# Patient Record
Sex: FEMALE | Race: WHITE | Employment: FULL TIME | URBAN - METROPOLITAN AREA
[De-identification: names, ages, dates, MRNs, and addresses within clinical notes are randomized per-mention and may not be internally consistent; named-entity substitution may affect disease eponyms.]

---

## 2016-03-14 LAB — ANTIBODY SCREEN, EXTERNAL: NEGATIVE

## 2016-10-17 LAB
CHLAMYDIA, EXTERNAL: NEGATIVE
HBSAG, EXTERNAL: NEGATIVE
HIV, EXTERNAL: NON REACTIVE
N. GONORRHEA, EXTERNAL: NEGATIVE
RUBELLA, EXTERNAL: NORMAL
TYPE, ABO & RH, EXTERNAL: NORMAL

## 2017-03-14 LAB — T. PALLIDUM, EXTERNAL: NEGATIVE

## 2017-05-11 ENCOUNTER — HOSPITAL ENCOUNTER (OUTPATIENT)
Age: 27
Discharge: HOME | End: 2017-05-12
Attending: OBSTETRICS & GYNECOLOGY | Admitting: OBSTETRICS & GYNECOLOGY
Payer: COMMERCIAL

## 2017-05-11 LAB
ALBUMIN SERPL BCP-MCNC: 2.8 G/DL (ref 3.5–5)
ALBUMIN/GLOB SERPL: 0.7 {RATIO} (ref 1.1–2.2)
ALP SERPL-CCNC: 203 U/L (ref 45–117)
ALT SERPL-CCNC: 24 U/L (ref 12–78)
ANION GAP BLD CALC-SCNC: 11 MMOL/L (ref 5–15)
AST SERPL W P-5'-P-CCNC: 14 U/L (ref 15–37)
BILIRUB SERPL-MCNC: 0.3 MG/DL (ref 0.2–1)
BUN SERPL-MCNC: 5 MG/DL (ref 6–20)
BUN/CREAT SERPL: 9 (ref 12–20)
CALCIUM SERPL-MCNC: 9.2 MG/DL (ref 8.5–10.1)
CHLORIDE SERPL-SCNC: 104 MMOL/L (ref 97–108)
CO2 SERPL-SCNC: 20 MMOL/L (ref 21–32)
CREAT SERPL-MCNC: 0.56 MG/DL (ref 0.55–1.02)
CREAT UR-MCNC: 167 MG/DL
ERYTHROCYTE [DISTWIDTH] IN BLOOD BY AUTOMATED COUNT: 15.1 % (ref 11.5–14.5)
GLOBULIN SER CALC-MCNC: 4 G/DL (ref 2–4)
GLUCOSE SERPL-MCNC: 107 MG/DL (ref 65–100)
GRBS, EXTERNAL: NORMAL
HCT VFR BLD AUTO: 35.7 % (ref 35–47)
HGB BLD-MCNC: 11.3 G/DL (ref 11.5–16)
LDH SERPL L TO P-CCNC: 170 U/L (ref 81–246)
MCH RBC QN AUTO: 23.8 PG (ref 26–34)
MCHC RBC AUTO-ENTMCNC: 31.7 G/DL (ref 30–36.5)
MCV RBC AUTO: 75.3 FL (ref 80–99)
PLATELET # BLD AUTO: 353 K/UL (ref 150–400)
POTASSIUM SERPL-SCNC: 4.2 MMOL/L (ref 3.5–5.1)
PROT SERPL-MCNC: 6.8 G/DL (ref 6.4–8.2)
PROT UR-MCNC: 43 MG/DL (ref 0–11.9)
PROT/CREAT UR-RTO: 0.3
RBC # BLD AUTO: 4.74 M/UL (ref 3.8–5.2)
SODIUM SERPL-SCNC: 135 MMOL/L (ref 136–145)
URATE SERPL-MCNC: 3.3 MG/DL (ref 2.6–6)
WBC # BLD AUTO: 9 K/UL (ref 3.6–11)

## 2017-05-11 PROCEDURE — 99218 HC RM OBSERVATION: CPT

## 2017-05-11 PROCEDURE — 83615 LACTATE (LD) (LDH) ENZYME: CPT | Performed by: OBSTETRICS & GYNECOLOGY

## 2017-05-11 PROCEDURE — 77030012890

## 2017-05-11 PROCEDURE — 36415 COLL VENOUS BLD VENIPUNCTURE: CPT | Performed by: OBSTETRICS & GYNECOLOGY

## 2017-05-11 PROCEDURE — 84156 ASSAY OF PROTEIN URINE: CPT | Performed by: OBSTETRICS & GYNECOLOGY

## 2017-05-11 PROCEDURE — 80053 COMPREHEN METABOLIC PANEL: CPT | Performed by: OBSTETRICS & GYNECOLOGY

## 2017-05-11 PROCEDURE — 85027 COMPLETE CBC AUTOMATED: CPT | Performed by: OBSTETRICS & GYNECOLOGY

## 2017-05-11 PROCEDURE — 87081 CULTURE SCREEN ONLY: CPT | Performed by: OBSTETRICS & GYNECOLOGY

## 2017-05-11 PROCEDURE — 99285 EMERGENCY DEPT VISIT HI MDM: CPT

## 2017-05-11 PROCEDURE — 84550 ASSAY OF BLOOD/URIC ACID: CPT | Performed by: OBSTETRICS & GYNECOLOGY

## 2017-05-11 RX ORDER — DOCUSATE SODIUM 100 MG/1
100 CAPSULE, LIQUID FILLED ORAL
Status: DISCONTINUED | OUTPATIENT
Start: 2017-05-11 | End: 2017-05-12 | Stop reason: HOSPADM

## 2017-05-11 RX ORDER — LANOLIN ALCOHOL/MO/W.PET/CERES
1 CREAM (GRAM) TOPICAL
Status: DISCONTINUED | OUTPATIENT
Start: 2017-05-12 | End: 2017-05-12 | Stop reason: HOSPADM

## 2017-05-11 RX ORDER — SWAB
1 SWAB, NON-MEDICATED MISCELLANEOUS DAILY
Status: DISCONTINUED | OUTPATIENT
Start: 2017-05-12 | End: 2017-05-12 | Stop reason: HOSPADM

## 2017-05-11 RX ORDER — ZOLPIDEM TARTRATE 5 MG/1
5 TABLET ORAL
Status: DISCONTINUED | OUTPATIENT
Start: 2017-05-11 | End: 2017-05-12 | Stop reason: HOSPADM

## 2017-05-11 RX ORDER — SODIUM CHLORIDE 0.9 % (FLUSH) 0.9 %
5-10 SYRINGE (ML) INJECTION EVERY 8 HOURS
Status: DISCONTINUED | OUTPATIENT
Start: 2017-05-11 | End: 2017-05-12 | Stop reason: HOSPADM

## 2017-05-11 RX ORDER — SODIUM CHLORIDE 0.9 % (FLUSH) 0.9 %
5-10 SYRINGE (ML) INJECTION AS NEEDED
Status: DISCONTINUED | OUTPATIENT
Start: 2017-05-11 | End: 2017-05-12 | Stop reason: HOSPADM

## 2017-05-11 RX ORDER — ACETAMINOPHEN 325 MG/1
650 TABLET ORAL
Status: DISCONTINUED | OUTPATIENT
Start: 2017-05-11 | End: 2017-05-12 | Stop reason: HOSPADM

## 2017-05-11 RX ORDER — FAMOTIDINE 20 MG/1
20 TABLET, FILM COATED ORAL
Status: DISCONTINUED | OUTPATIENT
Start: 2017-05-11 | End: 2017-05-12 | Stop reason: HOSPADM

## 2017-05-11 NOTE — H&P
EDC:2017  EGA: 36 weeks, 5 days    High level admission    HPI:    at 36w5d with GHTN, LGA, presents to L&D to r/o Pre-E per Dr. Andreina Clayton    /90, repeat 124/96. Intermittent HA over the past few days - relieved temporarily with Tylenol. C/o some visual changes, Denies RUQ pain. To [facility] to r/o Pre-E.   EFW >99% (9-10, 4372g), LIZETTE 13.9cm, anterior placenta, vtx, BPP 8/8. Dr. Andreina Clayton discussed risks and benefits of ALEXANDER vs 1LTCS. Strongly considering 1LTCS. GBS cx obtained today    Vital Signs   32Years Old Female  Weight: 314.5 pounds  BMI:      46.61  BP:       132/90    Pap/HPV/Gardasil History   History of abnormal pap: no  Gardasil Injection History: Pt Advised/Considering Gardasil    Patient's Prenatal Care with Doctor of Record Kerwin Wilson MD Notable For -    Decreased fetal movement  Anemia on FE pregnant  LGA ____ >99%ile at 24wks &28wks  Obesity in pregnancy BMI>34.99-FS ____, BMI 44, FS ___, growth q4wk ___, wkly surveillance 36wk __, consider delivery by 40wk   lab screening  Normal pregnancy primigravida  Family History Breast Cancer    Allergies    This patient has no known allergies. Medications Removed from Medication List    [address] [place] [address] for Follow-up Visit     Estimated weeks of        gestation:  36 5/7     Weight:  314.5     Blood pressure: 132 / 90     Urine Protein:  N     Urine Glucose: N     Headache:  few     Nausea/vomiting: No     Edema:  feet     Vaginal bleeding: no     Vaginal discharge: d/c     Fetal activity:  yes     Fundal height:    45     FHR:   141     Labor symptoms: few ctx     Fetal position:  vertex     Cx Dilation:  FT     Cx Effacement: 70%     Cx Station:  -2     Next visit:  1 wk     Preceptor:  michael     Comment:   Repeat /96. Intermittent HA over the past few days - relieved w/ tylenol then returned, some visual changes started today, no RUQ pain. To [facility] (w/ GBS in hand) to r/o PEC.  EFW >99% (9-10, 4372g), LIZETTE 13.9cm, anterior placenta, vtx, BPP . Discussed R/B of ALEXANDER vs 1LTCS. Strongly considering 1LTCS. Impression & Recommendations:        Problem # 1:  Transient HTN in pregnancy (ICD-642.33) (LZY02-V03.3)    BP elevated x2 in office. New onset HA and visual changes. Recommend L&D evaluation to r/o Pre-E/HELLP. Serial BP, labs, NST. Pt planning [facility] delivery so prefers to go there for evaluation. Problem #2:   LGA s/p U/s today with EFW 99% ;; 9lbs 10oz. Nl glucola    Fetal Weight Calculation:  EFW 4,372 g  >99%   [facility]lock  EFW (lb,oz) 9 lb  10  oz  Calculated by [facility]lock (BPD-HC-AC-FL)    Discussed R/B of ALEXANDER vs 1LTCS. Pt strongly considering 1LTCS. Problem # 3:    GBS cx pending    Medications (at conclusion of this visit)    10/17/2016 PRENATAL FORMULA CAPS (PRENATAL VIT-FE FUM-FA-OMEGA CAPS)     Primary Provider:  Tom West MD      History of Present Illness:  31 y/o  @ 36.5wks presents for routine PN visit and growth scan. New onset HA and visual changes. No RUQ pain. No ctx, LOF, VB. Good fetal movement. Pregnancy complicated by obesity (BMI 46) and LGA (EFW >99% 9-10 today). Past Medical History:     Reviewed history from 2016 and no changes required:        Anxiety    Past Surgical History:     Reviewed history from 2016 and no changes required:        wisdom teeth    Family History Summary:      Reviewed history Last on 2017 and no changes required:2017      General Comments - FH:  Family history transferred to [facility] [employer]        Social History:     Reviewed history from 10/17/2016 and no changes required:                Works at [employer]                Smoking History:        Patient is a former smoker. Review of Systems        See HPI    Except as noted in the HPI, the review of systems is negative for General, Breast, , Resp, GI, Endo, MS, Psych and Heme.       Vital Signs    Blood Pressure: 132 / 90    Weight:  314.5 pounds    BMI:   46.61 inches    Physical Exam     General           General appearance:  no acute distress    Head           Inspection:   normal    Eyes           External:   EOM intact    ENT           Dental:   adequate dentition    Chest           Lungs:  clear to auscultation          Heart:  regular rate and rhythm    Extremeties           Extremeties:  tr edema bilaterally    Neurological           Reflexes:  2+ and symmetric with no pathological reflexes    Psych           Orientation:  oriented to time, place, and person          Mood:  no appearance of anxiety, depression, or agitation    Lymph           Inguinal:  no inguinal adenopathy    Skin           Inspection:  no rashes, suspicious lesions, or ulcerations    Abdomen           Abdomen:  gravid          Fundal Height:  45          EFW:  9-10    Pelvic Exam           EGBUS:  no lesions          Vagina:  normal appearing without lesions or discharge          Uterus:  gravid          Cervix:  no lesions or discharge                  Dilation: : FT                  Effacement:  70%                  Station:  -2                  Presentation:  vertex                  Membranes:  intact                  Position: mid    Allergies    This patient has no known allergies. Medications Removed from Medication List        Impression & Recommendations:    Problem # 1:  Normal pregnancy primigravida (ICD-V22.1) (UXJ19-K62.87)  GBS collected today. Pt to take to [address] [place] [address] L&D. Orders:  Antepartum Care (CPT-10)      Problem # 2:  Transient HTN in pregnancy (ICD-642.33) (WYD00-E06.3)  BP elevated x2 in office. New onset HA and visual changes. Recommend L&D evaluation to r/o PEC/HELLP. Serial BP, labs, NST. Pt planning [address] [place] [address] delivery so prefers to go there for evaluation. Orders:  Patient Sent to L&D (CPT-LD)  Sent to L&D  (CPT-AdmitF)      Problem # 3:  LGA ____ >99%ile at 24wks &28wks (ICD-656.63) (PDI49-X97.62x0)  EFW >99% (9-10) today.    Discussed R/B of ALEXANDER vs 1LTCS. Pt strongly considering 1LTCS. Problem # 4:  Obesity in pregnancy BMI>34.99-FS ____, BMI 44, FS ___, growth q4wk ___, wkly surveillance 36wk __, consider delivery by 40wk (OJE-821.95) (VMN07-P33.213)    Orders:  Weekly PNV w/ NST + BPP No MFM (NST & BPP @ each visit) (xxxx)        Medications (at conclusion of this visit)    10/17/2016 PRENATAL FORMULA CAPS (PRENATAL VIT-FE FUM-FA-OMEGA CAPS)           LABORATORY DATA   TEST DATE RESULT   Group B Strep culture                                     (Group B Strep Culture Result Field)   Blood Type 10/17/2016 O                                             (Blood Type Result Field)   Rh 10/17/2016 Positive                                   (Rh Result Field)   Rhogam Inj Given     Tdap Vaccine Given 03/14/2017 Vacc. [address]   Antibody Screen 03/14/2017 Negative   Rubella  [facility] Reference Ranges On or After 3/10/14                  <0.90              Non-immune      0.90 - 0.99     Equivocal      >0.99              Immune    [facility] Reference Ranges  Before 3/10/14           <5                 Non-immune             5 - 9               Equivocal            >9                 Immune  [facility] Reference Ranges       < Or = 0.90       Negative             0.91-1.09          Equivocal            > Or = 1.10       Positive   10/17/2016     2.05     TPA (T Pallidum Antibodies) 03/14/2017 Negative   Serology (RPR)     HBsAg 10/17/2016 Negative   HIV 10/17/2016 Non Reactive   Hemoglobin 03/14/2017 11.0   Hematocrit 03/14/2017 32.6   Platelets 24/83/0415 276 X10E3/UL   TSH     Urine Culture 10/17/2016 Negative   GC DNA Probe 10/17/2016 Negative   Chlamydia DNA 10/17/2016 Negative   PAP 10/17/2016 NIL   Flu Vaccine Given 10/17/2016 Vacc.  VWC   HGBA1C     HGB Electro     T4, Free     BG Fasting     GTT 1H 50G 03/14/2017 119   GTT 1H 100G     GTT 2H 100G     GTT 3H 100G     Glucose Plasma     CF Accept or Decline 10/17/2016 Declined   CF Screen Result 10/17/2016 Declined Nuchal Trans 02/14/2017 6.92^6. 92 mm&millimeters   AFP Only     Tetra 01/19/2017 *Screen Negative*   AFP Serum     CVS 10/17/2016 declined   AFP Amniotic     Amnio Karyo     FISH     GC Culture     Chlamydia Cult     Ureaplasma     Mycoplasma     WBC 10/17/2016 9.6 X10E3/UL   RBC 10/17/2016 5.07 X10E6/UL   MCV 10/17/2016 79   MCH 10/17/2016 25.4   MCHC RBC 10/17/2016 32.1     ULTRASOUND DATA   TEST DATE RESULT   Estimated Fetal Weight 04/11/2017 1974.80153971^4882 g&grams                                     Weight % 04/11/2017 100^>99% %&percent                                                LIZETTE 04/11/2017 52.22^52.6 cm&centimeters                    BPP 04/11/2017 8^8 [n/a]&Not applicable   Cervical Length (mm)       ]      Electronically signed by Paramjit Caicedo MD on 05/11/2017 at 10:06 AM    ________________________________________________________________________

## 2017-05-11 NOTE — PROGRESS NOTES
200 Admit 31 y/o  36.5 weeks to LD room 7 due to elevated blood pressures in office today. Headache consistent for last 3 days and visual disturbances present today in office. Denies RUQ pain, bleeding, leaking of fluid. EFM x 2 applied. 200 Dr. Lennox Ireland notified of blood pressures and lab results. 1234 Dr. Lennox Ireland at bedside. Reviewed tracing. Discussing plan of care with patient. Orders to transfer to APU and start 24 hour urine. Patient agrees with plan of care at this time. 1315 TRANSFER - OUT REPORT:    Verbal report given to KALYANI Zepeda Rn(name) on Boni Degroot  being transferred to APu 325(unit) for routine progression of care       Report consisted of patients Situation, Background, Assessment and   Recommendations(SBAR). Information from the following report(s) SBAR, Kardex, Procedure Summary, Intake/Output, MAR and Recent Results was reviewed with the receiving nurse. Lines:       Opportunity for questions and clarification was provided.       Patient transported with:   Registered Nurse

## 2017-05-11 NOTE — PROGRESS NOTES
TRANSFER - IN REPORT:    Verbal report received from SAGE Ventura RN(name) on Tian Mccracken  being received from L&D (unit) for routine progression of care      Report consisted of patients Situation, Background, Assessment and   Recommendations(SBAR). Information from the following report(s) SBAR was reviewed with the receiving nurse. Opportunity for questions and clarification was provided. Assessment completed upon patients arrival to unit and care assumed.

## 2017-05-11 NOTE — PROGRESS NOTES
Bedside and Verbal shift change report given to ALIYA Canas RN (oncoming nurse) by KALYANI Kirkland RN (offgoing nurse). Report included the following information SBAR, Kardex, MAR and Recent Results.

## 2017-05-11 NOTE — IP AVS SNAPSHOT
[address] 
[phone] Patient: Magan Gutiérrez MRN: [id number] MMN:[id number] You are allergic to the following No active allergies Recent Documentation Height Weight Breastfeeding? BMI OB Status Smoking Status 1.753 m 142.4 kg No 46.37 kg/m2 Pregnant Former Smoker Unresulted Labs Order Current Status SAMPLE TO BLOOD BANK In process CULTURE, GENITAL GROUP B STREP Preliminary result Emergency Contacts Name Discharge Info Relation Home Work Mobile Fina Otero  Parent [1] [phone] Miller Smith DISCHARGE CAREGIVER [3] Spouse [3] [phone] About your hospitalization You were admitted on:  May 11, 2017 You last received care in the[address] [facility] You were discharged on:  May 12, 2017 Unit phone number:  [phone] Why you were hospitalized Your primary diagnosis was:  Not on File Providers Seen During Your Hospitalizations Provider Role Specialty Primary office phone Tyrone Marrufo MD Attending Provider Obstetrics & Gynecology [phone] Your Primary Care Physician (PCP) Primary Care Physician Office Phone Office Fax Tatiana [place] [phone] Follow-up Information Follow up With Details Comments Contact Info Tyrone Marrufo MD   [address] [facility] 
[phone] Jose Martin Chapa MD On 5/15/2017 be here by 10am for 12p C/S. 200 [facility] [address] [place] [address]  [address] 
[phone] Current Discharge Medication List  
  
CONTINUE these medications which have NOT CHANGED Dose & Instructions Dispensing Information Comments Morning Noon Evening Bedtime PNV No12-Iron-FA-DSS-OM-3 29 mg iron-1 mg -50 mg Cpkd Your last dose was: Your next dose is: Take  by mouth. Refills:  0 Discharge Instructions Please call for BP >160/110 Call for headache, visual changes, right upper quadrant pain. Preeclampsia: Care Instructions Your Care Instructions Preeclampsia occurs when a woman's blood pressure rises during pregnancy. Often with preeclampsia, you also have swelling in your legs, hands, and face. A test may show too much protein in your urine. Preeclampsia is also called toxemia. If preeclampsia is severe and not treated, it can lead to seizures (eclampsia) and damage to your liver or kidneys. Preeclampsia can prevent your baby from getting enough food and oxygen. This can cause a low birth weight or other problems. Your doctor will watch you closely to prevent these problems. He or she also may recommend that you rest in bed most of the day. If your preeclampsia is a danger to your health or the health of your baby, your doctor may need to deliver your baby early. While preeclampsia is a concern, most women with preeclampsia have healthy babies. After a woman gives birth, preeclampsia usually goes away on its own. Follow-up care is a key part of your treatment and safety. Be sure to make and go to all appointments, and call your doctor if you are having problems. It's also a good idea to know your test results and keep a list of the medicines you take. How can you care for yourself at home? · Take and record your blood pressure at home if your doctor tells you to. ¨ Learn the importance of the two measures of blood pressure (such as 120 over 80, or 120/80). The first number is the systolic pressure, which is the force of blood on the artery walls as the heart pumps. The second number is the diastolic pressure, which is the force of blood on the artery walls between heartbeats, when the heart is at rest. You have a choice of monitors to use. ¨ Manual monitor: You pump up the cuff and use a stethoscope to listen for your pulse. ¨ Electronic monitor: The cuff inflates, and a gauge shows your pulse rate. ¨ To take your blood pressure: ¨ Ask your doctor to check your blood pressure monitor to be sure that it is accurate and that the cuff fits you. Also ask your doctor to watch you to make sure that you are using it right. ¨ You should not eat, use tobacco products, or use medicine known to raise blood pressure (such as some nasal decongestant sprays) before you take your blood pressure. ¨ Avoid taking your blood pressure if you have just exercised or are nervous or upset. Rest at least 15 minutes before you take your blood pressure. · If your doctor advises, check the protein levels in your urine. Your doctor or nurse will show you how to do this. · Take your medicines exactly as prescribed. Call your doctor if you think you are having a problem with your medicine. · Do not smoke. Quitting smoking will help lower your blood pressure and improve your baby's growth and health. If you need help quitting, talk to your doctor about stop-smoking programs and medicines. These can increase your chances of quitting for good. · Eat a balanced and healthy diet that has lots of fruits and vegetables. · If your doctor advised bed rest, be sure to stay off your feet and rest as much as possible. ¨ Keep a phone, phone book, notepad, and pen near the bed where you can easily reach them. ¨ Gently stretch your legs every hour to maintain good blood flow. ¨ Have another family member pack snacks and lunch food in a cooler close to your bed. ¨ Use this time for activities that you usually cannot find time for, such as reading, craft projects, or letter writing. · You can keep track of your baby's health by noting the length of time it takes to count 10 movements (such as kicks, flutters, or rolls).  Feeling 10 movements in less than 1 hour is considered normal. Track your baby's movements once each day, and bring this record with you to each prenatal visit. When should you call for help? Call 911 anytime you think you may need emergency care. For example, call if: 
· You have a seizure. · You passed out (lost consciousness). · You have severe vaginal bleeding. · You have severe pain in your belly or pelvis. · You have had fluid gushing or leaking from your vagina and you know or think the umbilical cord is bulging into your vagina. If this happens, immediately get down on your knees so your rear end (buttocks) is higher than your head. This will decrease the pressure on the cord until help arrives. Call your doctor now or seek immediate medical care if: 
· You have sudden swelling of your face, hands, or feet. · You have new vision problems (such as dimness or blurring). · You have a severe headache. · You have any vaginal bleeding. · You have belly pain or cramping. · You have a fever. · You have had regular contractions (with or without pain) for an hour. This means that you have 8 or more within 1 hour or 4 or more in 20 minutes after you change your position and drink fluids. · You have a sudden release of fluid from the vagina. · You have low back pain or pelvic pressure that does not go away. · You notice that your baby has stopped moving or is moving much less than normal. 
Watch closely for changes in your health, and be sure to contact your doctor if you have any questions. Where can you learn more? Go to http://jai-memo.info/. Enter G277 in the search box to learn more about \"Preeclampsia: Care Instructions. \" Current as of: May 30, 2016 Content Version: 11.2 © 7006-0684 [employer]  Care instructions adapted under license by [employer] (which disclaims liability or warranty for this information). If you have questions about a medical condition or this instruction, always ask your healthcare professional. [address] any warranty or liability for your use of this information. Discharge Orders None [facility] Announcement We are excited to announce that we are making your provider's discharge notes available to you in [facility]. You will see these notes when they are completed and signed by the physician that discharged you from your recent hospital stay. If you have any questions or concerns about any information you see in [facility], please call the Health Information Department where you were seen or reach out to your Primary Care Provider for more information about your plan of care. Introducing hospitals & HEALTH SERVICES! Toribio Nyhan introduces [facility] patient portal. Now you can access parts of your medical record, email your doctor's office, and request medication refills online. 1. In your internet browser, go to [email or web address] 2. Click on the First Time User? Click Here link in the Sign In box. You will see the New Member Sign Up page. 3. Enter your [facility] Access Code exactly as it appears below. You will not need to use this code after youve completed the sign-up process. If you do not sign up before the expiration date, you must request a new code. · [facility] Access Code: [email or web address] Expires: 8/10/2017 11:33 AM 
 
4. Enter the last four digits of your Social Security Number (xxxx) and Date of Birth (mm/dd/yyyy) as indicated and click Submit. You will be taken to the next sign-up page. 5. Create a [facility] ID. This will be your [facility] login ID and cannot be changed, so think of one that is secure and easy to remember. 6. Create a [facility] password. You can change your password at any time. 7. Enter your Password Reset Question and Answer. This can be used at a later time if you forget your password. 8. Enter your e-mail address. You will receive e-mail notification when new information is available in [address] 9. Click Sign Up. You can now view and download portions of your medical record. 10. Click the Download Summary menu link to download a portable copy of your medical information. If you have questions, please visit the Frequently Asked Questions section of the [employer] website. Remember, [employer] is NOT to be used for urgent needs. For medical emergencies, dial 911. Now available from your iPhone and Android! General Information Please provide this summary of care documentation to your next provider. Patient Signature:  ____________________________________________________________ Date:  ____________________________________________________________  
  
[place] Search Provider Signature:  ____________________________________________________________ Date:  ____________________________________________________________

## 2017-05-12 VITALS
HEART RATE: 92 BPM | TEMPERATURE: 98.4 F | SYSTOLIC BLOOD PRESSURE: 125 MMHG | WEIGHT: 293 LBS | HEIGHT: 69 IN | DIASTOLIC BLOOD PRESSURE: 75 MMHG | RESPIRATION RATE: 16 BRPM | BODY MASS INDEX: 43.4 KG/M2

## 2017-05-12 LAB
ALBUMIN SERPL BCP-MCNC: 2.3 G/DL (ref 3.5–5)
ALBUMIN/GLOB SERPL: 0.5 {RATIO} (ref 1.1–2.2)
ALP SERPL-CCNC: 177 U/L (ref 45–117)
ALT SERPL-CCNC: 22 U/L (ref 12–78)
ANION GAP BLD CALC-SCNC: 9 MMOL/L (ref 5–15)
AST SERPL W P-5'-P-CCNC: 11 U/L (ref 15–37)
BILIRUB SERPL-MCNC: 0.2 MG/DL (ref 0.2–1)
BUN SERPL-MCNC: 5 MG/DL (ref 6–20)
BUN/CREAT SERPL: 9 (ref 12–20)
CALCIUM SERPL-MCNC: 8.9 MG/DL (ref 8.5–10.1)
CHLORIDE SERPL-SCNC: 105 MMOL/L (ref 97–108)
CO2 SERPL-SCNC: 23 MMOL/L (ref 21–32)
COLLECT DURATION TIME UR: 24 HR
CREAT SERPL-MCNC: 0.54 MG/DL (ref 0.55–1.02)
ERYTHROCYTE [DISTWIDTH] IN BLOOD BY AUTOMATED COUNT: 15.2 % (ref 11.5–14.5)
GLOBULIN SER CALC-MCNC: 4.4 G/DL (ref 2–4)
GLUCOSE SERPL-MCNC: 106 MG/DL (ref 65–100)
HCT VFR BLD AUTO: 34 % (ref 35–47)
HGB BLD-MCNC: 10.9 G/DL (ref 11.5–16)
LDH SERPL L TO P-CCNC: 136 U/L (ref 81–246)
MCH RBC QN AUTO: 24 PG (ref 26–34)
MCHC RBC AUTO-ENTMCNC: 32.1 G/DL (ref 30–36.5)
MCV RBC AUTO: 74.7 FL (ref 80–99)
PLATELET # BLD AUTO: 297 K/UL (ref 150–400)
POTASSIUM SERPL-SCNC: 4.1 MMOL/L (ref 3.5–5.1)
PROT 24H UR-MRATE: 455 MG/24HR
PROT SERPL-MCNC: 6.7 G/DL (ref 6.4–8.2)
RBC # BLD AUTO: 4.55 M/UL (ref 3.8–5.2)
SODIUM SERPL-SCNC: 137 MMOL/L (ref 136–145)
SPECIMEN VOL ?TM UR: 3500 ML
URATE SERPL-MCNC: 3.2 MG/DL (ref 2.6–6)
WBC # BLD AUTO: 8.2 K/UL (ref 3.6–11)

## 2017-05-12 PROCEDURE — 36415 COLL VENOUS BLD VENIPUNCTURE: CPT | Performed by: OBSTETRICS & GYNECOLOGY

## 2017-05-12 PROCEDURE — 85027 COMPLETE CBC AUTOMATED: CPT | Performed by: OBSTETRICS & GYNECOLOGY

## 2017-05-12 PROCEDURE — 80053 COMPREHEN METABOLIC PANEL: CPT | Performed by: OBSTETRICS & GYNECOLOGY

## 2017-05-12 PROCEDURE — 99218 HC RM OBSERVATION: CPT

## 2017-05-12 PROCEDURE — 74011250637 HC RX REV CODE- 250/637: Performed by: OBSTETRICS & GYNECOLOGY

## 2017-05-12 PROCEDURE — 83615 LACTATE (LD) (LDH) ENZYME: CPT | Performed by: OBSTETRICS & GYNECOLOGY

## 2017-05-12 PROCEDURE — 84550 ASSAY OF BLOOD/URIC ACID: CPT | Performed by: OBSTETRICS & GYNECOLOGY

## 2017-05-12 RX ADMIN — Medication 1 TABLET: at 09:10

## 2017-05-12 RX ADMIN — FERROUS SULFATE TAB 325 MG (65 MG ELEMENTAL FE) 325 MG: 325 (65 FE) TAB at 09:10

## 2017-05-12 NOTE — PROGRESS NOTES
Ante Partum Progress Note    Rina Holman  36w6d    Assessment: 36w6d   1. GHTN. - now mild preE  - all BPs normal to mild range  - HA improved  - 24hr rwtr=551ez     2. LGA, nl Glucola, EFW 9lbs 10 oz ()  Pt unsure of ALEXANDER vs primary LTC/S. Will consider options and decide based on progress  - cvx today /high  - discussed IOL vs primary CS again - she would like primary CS, will schedule Monday.      3. GBS pending    Plan:  DC home with preE precautions - call for HA, RUQ pain, visual changes. - call for BP >160/110  - will schedule CS for Monday     Orders/Charges: Medium and Non Stress Test    Patient states she has no new complaints    Vitals:  Visit Vitals    /75 (BP 1 Location: Left arm)    Pulse 97    Temp 98.3 °F (36.8 °C)    Resp 18    Ht 5' 9\" (1.753 m)    Wt 142.4 kg (314 lb)    Breastfeeding No    BMI 46.37 kg/m2     Temp (24hrs), Av.1 °F (36.7 °C), Min:97.9 °F (36.6 °C), Max:98.3 °F (36.8 °C)      Last 24hr Input/Output:    Intake/Output Summary (Last 24 hours) at 17 1504  Last data filed at 17 3807   Gross per 24 hour   Intake             3100 ml   Output             2150 ml   Net              950 ml        Non stress test:  Reactive  Baseline 135, moderate variabiliy, +accels, no decels    No data found. No data found. Uterine Activity: irregular     Exam:  Patient without distress.      Abdomen, fundus soft non-tender     Extremities, no redness or tenderness         cvx - /high         Additional Exam: Deferred    Labs:     Lab Results   Component Value Date/Time    WBC 8.2 2017 06:23 AM    WBC 9.0 2017 11:30 AM    HGB 10.9 2017 06:23 AM    HGB 11.3 2017 11:30 AM    HCT 34.0 2017 06:23 AM    HCT 35.7 2017 11:30 AM    PLATELET 658  06:23 AM    PLATELET 936  11:30 AM       Recent Results (from the past 24 hour(s))   METABOLIC PANEL, COMPREHENSIVE    Collection Time: 17  6:23 AM   Result Value Ref Range    Sodium 137 136 - 145 mmol/L    Potassium 4.1 3.5 - 5.1 mmol/L    Chloride 105 97 - 108 mmol/L    CO2 23 21 - 32 mmol/L    Anion gap 9 5 - 15 mmol/L    Glucose 106 (H) 65 - 100 mg/dL    BUN 5 (L) 6 - 20 MG/DL    Creatinine 0.54 (L) 0.55 - 1.02 MG/DL    BUN/Creatinine ratio 9 (L) 12 - 20      GFR est AA >60 >60 ml/min/1.73m2    GFR est non-AA >60 >60 ml/min/1.73m2    Calcium 8.9 8.5 - 10.1 MG/DL    Bilirubin, total 0.2 0.2 - 1.0 MG/DL    ALT (SGPT) 22 12 - 78 U/L    AST (SGOT) 11 (L) 15 - 37 U/L    Alk.  phosphatase 177 (H) 45 - 117 U/L    Protein, total 6.7 6.4 - 8.2 g/dL    Albumin 2.3 (L) 3.5 - 5.0 g/dL    Globulin 4.4 (H) 2.0 - 4.0 g/dL    A-G Ratio 0.5 (L) 1.1 - 2.2     LD    Collection Time: 05/12/17  6:23 AM   Result Value Ref Range     81 - 246 U/L   URIC ACID    Collection Time: 05/12/17  6:23 AM   Result Value Ref Range    Uric acid 3.2 2.6 - 6.0 MG/DL   CBC W/O DIFF    Collection Time: 05/12/17  6:23 AM   Result Value Ref Range    WBC 8.2 3.6 - 11.0 K/uL    RBC 4.55 3.80 - 5.20 M/uL    HGB 10.9 (L) 11.5 - 16.0 g/dL    HCT 34.0 (L) 35.0 - 47.0 %    MCV 74.7 (L) 80.0 - 99.0 FL    MCH 24.0 (L) 26.0 - 34.0 PG    MCHC 32.1 30.0 - 36.5 g/dL    RDW 15.2 (H) 11.5 - 14.5 %    PLATELET 107 081 - 748 K/uL

## 2017-05-12 NOTE — PROGRESS NOTES
Bedside and Verbal shift change report given to Rick Blunt (oncoming nurse) by Eric Garcia RN (offgoing nurse). Report included the following information SBAR, Kardex and MAR.

## 2017-05-12 NOTE — DISCHARGE INSTRUCTIONS
Please call for BP >160/110  Call for headache, visual changes, right upper quadrant pain. Preeclampsia: Care Instructions  Your Care Instructions  Preeclampsia occurs when a woman's blood pressure rises during pregnancy. Often with preeclampsia, you also have swelling in your legs, hands, and face. A test may show too much protein in your urine. Preeclampsia is also called toxemia. If preeclampsia is severe and not treated, it can lead to seizures (eclampsia) and damage to your liver or kidneys. Preeclampsia can prevent your baby from getting enough food and oxygen. This can cause a low birth weight or other problems. Your doctor will watch you closely to prevent these problems. He or she also may recommend that you rest in bed most of the day. If your preeclampsia is a danger to your health or the health of your baby, your doctor may need to deliver your baby early. While preeclampsia is a concern, most women with preeclampsia have healthy babies. After a woman gives birth, preeclampsia usually goes away on its own. Follow-up care is a key part of your treatment and safety. Be sure to make and go to all appointments, and call your doctor if you are having problems. It's also a good idea to know your test results and keep a list of the medicines you take. How can you care for yourself at home? · Take and record your blood pressure at home if your doctor tells you to. ¨ Learn the importance of the two measures of blood pressure (such as 120 over 80, or 120/80). The first number is the systolic pressure, which is the force of blood on the artery walls as the heart pumps. The second number is the diastolic pressure, which is the force of blood on the artery walls between heartbeats, when the heart is at rest. You have a choice of monitors to use. ¨ Manual monitor: You pump up the cuff and use a stethoscope to listen for your pulse.   ¨ Electronic monitor: The cuff inflates, and a gauge shows your pulse rate.  ¨ To take your blood pressure:  ¨ Ask your doctor to check your blood pressure monitor to be sure that it is accurate and that the cuff fits you. Also ask your doctor to watch you to make sure that you are using it right. ¨ You should not eat, use tobacco products, or use medicine known to raise blood pressure (such as some nasal decongestant sprays) before you take your blood pressure. ¨ Avoid taking your blood pressure if you have just exercised or are nervous or upset. Rest at least 15 minutes before you take your blood pressure. · If your doctor advises, check the protein levels in your urine. Your doctor or nurse will show you how to do this. · Take your medicines exactly as prescribed. Call your doctor if you think you are having a problem with your medicine. · Do not smoke. Quitting smoking will help lower your blood pressure and improve your baby's growth and health. If you need help quitting, talk to your doctor about stop-smoking programs and medicines. These can increase your chances of quitting for good. · Eat a balanced and healthy diet that has lots of fruits and vegetables. · If your doctor advised bed rest, be sure to stay off your feet and rest as much as possible. ¨ Keep a phone, phone book, notepad, and pen near the bed where you can easily reach them. ¨ Gently stretch your legs every hour to maintain good blood flow. ¨ Have another family member pack snacks and lunch food in a cooler close to your bed. ¨ Use this time for activities that you usually cannot find time for, such as reading, craft projects, or letter writing. · You can keep track of your baby's health by noting the length of time it takes to count 10 movements (such as kicks, flutters, or rolls). Feeling 10 movements in less than 1 hour is considered normal. Track your baby's movements once each day, and bring this record with you to each prenatal visit. When should you call for help?   Call 911 anytime you think you may need emergency care. For example, call if:  · You have a seizure. · You passed out (lost consciousness). · You have severe vaginal bleeding. · You have severe pain in your belly or pelvis. · You have had fluid gushing or leaking from your vagina and you know or think the umbilical cord is bulging into your vagina. If this happens, immediately get down on your knees so your rear end (buttocks) is higher than your head. This will decrease the pressure on the cord until help arrives. Call your doctor now or seek immediate medical care if:  · You have sudden swelling of your face, hands, or feet. · You have new vision problems (such as dimness or blurring). · You have a severe headache. · You have any vaginal bleeding. · You have belly pain or cramping. · You have a fever. · You have had regular contractions (with or without pain) for an hour. This means that you have 8 or more within 1 hour or 4 or more in 20 minutes after you change your position and drink fluids. · You have a sudden release of fluid from the vagina. · You have low back pain or pelvic pressure that does not go away. · You notice that your baby has stopped moving or is moving much less than normal.  Watch closely for changes in your health, and be sure to contact your doctor if you have any questions. Where can you learn more? Go to http://jai-memo.info/. Enter R047 in the search box to learn more about \"Preeclampsia: Care Instructions. \"  Current as of: May 30, 2016  Content Version: 11.2  © 2728-0024 [employer]. Care instructions adapted under license by [employer] (which disclaims liability or warranty for this information). If you have questions about a medical condition or this instruction, always ask your healthcare professional. [address] any warranty or liability for your use of this information.

## 2017-05-12 NOTE — DISCHARGE SUMMARY
Antepartum  Discharge Summary     Patient ID:  Abhilash Coelho  [id number]  32 y.o.  1990    Admit date: 5/11/2017    Discharge date: 5/12/2017    Admission Diagnoses: There is no problem list on file for this patient. Discharge Diagnoses: There are no discharge diagnoses documented for the most recent discharge. There is no problem list on file for this patient. Procedures for this admission:     Hospital Course: labs normal. BPs normal to mildly elevated. 24h UTP =455mg  1. GHTN. - now mild preE  - all BPs normal to mild range  - HA improved  - 24hr naxn=621dx      2. LGA, nl Glucola, EFW 9lbs 10 oz (5/11)  Pt unsure of ALEXANDER vs primary LTC/S. Will consider options and decide based on progress  - cvx today 1/30/high  - discussed IOL vs primary CS again - she would like primary CS, will schedule Monday.       3. GBS pending     Plan: DC home with preE precautions - call for HA, RUQ pain, visual changes. - call for BP >160/110  - will schedule CS for Monday     Disposition: home    Discharged Condition: stable            Patient Instructions:   Current Discharge Medication List      CONTINUE these medications which have NOT CHANGED    Details   PNV No12-Iron-FA-DSS-OM-3 29 mg iron-1 mg -50 mg CPKD Take  by mouth. Activity: modified bedrest   Diet: Regular Diet    Follow-up with   Follow-up Appointments   Procedures    FOLLOW UP VISIT Appointment in: Other (Specify) [address] on [id number] on monday     Standing Status:   Standing     Number of Occurrences:   1     Order Specific Question:   Appointment in     Answer:    Other (Specify)        Signed:  Pirya Alfonso MD  5/12/2017  3:22 PM

## 2017-05-12 NOTE — PROGRESS NOTES
Bedside shift change report given to Grayson Sevilla RN (oncoming nurse) by Angle Moraes RN (offgoing nurse). Report included the following information SBAR, Kardex and MAR.     1130 24hour urine sample taken to lab.    1620 pt to be discharged home with return on Monday morning for a  section. All instructions given and questions answered. Pt ambulated off unit at 1640.

## 2017-05-14 ENCOUNTER — ANESTHESIA (OUTPATIENT)
Dept: LABOR AND DELIVERY | Age: 27
End: 2017-05-14
Payer: COMMERCIAL

## 2017-05-14 ENCOUNTER — ANESTHESIA EVENT (OUTPATIENT)
Dept: LABOR AND DELIVERY | Age: 27
End: 2017-05-14
Payer: COMMERCIAL

## 2017-05-14 ENCOUNTER — HOSPITAL ENCOUNTER (INPATIENT)
Age: 27
LOS: 3 days | Discharge: HOME | End: 2017-05-17
Attending: OBSTETRICS & GYNECOLOGY | Admitting: OBSTETRICS & GYNECOLOGY
Payer: COMMERCIAL

## 2017-05-14 PROBLEM — O42.90 PROM (PREMATURE RUPTURE OF MEMBRANES): Status: ACTIVE | Noted: 2017-05-14

## 2017-05-14 PROBLEM — O99.210 MATERNAL OBESITY AFFECTING PREGNANCY, ANTEPARTUM: Status: ACTIVE | Noted: 2017-05-14

## 2017-05-14 PROBLEM — O14.90 PREECLAMPSIA: Status: ACTIVE | Noted: 2017-05-14

## 2017-05-14 LAB
BACTERIA SPEC CULT: NORMAL
BASOPHILS # BLD AUTO: 0 K/UL (ref 0–0.1)
BASOPHILS # BLD: 0 % (ref 0–1)
EOSINOPHIL # BLD: 0.1 K/UL (ref 0–0.4)
EOSINOPHIL NFR BLD: 1 % (ref 0–7)
ERYTHROCYTE [DISTWIDTH] IN BLOOD BY AUTOMATED COUNT: 15.2 % (ref 11.5–14.5)
GRBS, EXTERNAL: NEGATIVE
HCT VFR BLD AUTO: 35.6 % (ref 35–47)
HGB BLD-MCNC: 11.2 G/DL (ref 11.5–16)
LYMPHOCYTES # BLD AUTO: 14 % (ref 12–49)
LYMPHOCYTES # BLD: 1.4 K/UL (ref 0.8–3.5)
MCH RBC QN AUTO: 23.8 PG (ref 26–34)
MCHC RBC AUTO-ENTMCNC: 31.5 G/DL (ref 30–36.5)
MCV RBC AUTO: 75.7 FL (ref 80–99)
MONOCYTES # BLD: 0.7 K/UL (ref 0–1)
MONOCYTES NFR BLD AUTO: 7 % (ref 5–13)
NEUTS SEG # BLD: 7.9 K/UL (ref 1.8–8)
NEUTS SEG NFR BLD AUTO: 78 % (ref 32–75)
PLATELET # BLD AUTO: 352 K/UL (ref 150–400)
RBC # BLD AUTO: 4.7 M/UL (ref 3.8–5.2)
SERVICE CMNT-IMP: NORMAL
WBC # BLD AUTO: 10 K/UL (ref 3.6–11)

## 2017-05-14 PROCEDURE — 75410000002 HC LABOR FEE PER 1 HR

## 2017-05-14 PROCEDURE — 77030011220 HC DEV ELECSURG COVD -B

## 2017-05-14 PROCEDURE — 77030002974 HC SUT PLN J&J -A

## 2017-05-14 PROCEDURE — 74011250636 HC RX REV CODE- 250/636: Performed by: ANESTHESIOLOGY

## 2017-05-14 PROCEDURE — 76010000391 HC C SECN FIRST 1 HR: Performed by: OBSTETRICS & GYNECOLOGY

## 2017-05-14 PROCEDURE — 77030033269 HC SLV COMPR SCD KNE2 CARD -B

## 2017-05-14 PROCEDURE — 77030018717 HC DRSG GRNUFM KCON -B

## 2017-05-14 PROCEDURE — 77030018836 HC SOL IRR NACL ICUM -A

## 2017-05-14 PROCEDURE — 74011250636 HC RX REV CODE- 250/636

## 2017-05-14 PROCEDURE — 77030034849

## 2017-05-14 PROCEDURE — 85025 COMPLETE CBC W/AUTO DIFF WBC: CPT | Performed by: OBSTETRICS & GYNECOLOGY

## 2017-05-14 PROCEDURE — 76060000078 HC EPIDURAL ANESTHESIA: Performed by: OBSTETRICS & GYNECOLOGY

## 2017-05-14 PROCEDURE — 77030033542 HC RETRCTR RETNTS PANICLS GQSM -B

## 2017-05-14 PROCEDURE — 74011000250 HC RX REV CODE- 250

## 2017-05-14 PROCEDURE — 99282 EMERGENCY DEPT VISIT SF MDM: CPT

## 2017-05-14 PROCEDURE — 77030014144 HC TY SPN ANES BBMI -B

## 2017-05-14 PROCEDURE — 77030007866 HC KT SPN ANES BBMI -B: Performed by: ANESTHESIOLOGY

## 2017-05-14 PROCEDURE — 77030019952 HC CANSTR VAC ASST KCON -B

## 2017-05-14 PROCEDURE — 77030018846 HC SOL IRR STRL H20 ICUM -A

## 2017-05-14 PROCEDURE — 77030012935 HC DRSG AQUACEL BMS -B

## 2017-05-14 PROCEDURE — 65410000002 HC RM PRIVATE OB

## 2017-05-14 PROCEDURE — 77030002933 HC SUT MCRYL J&J -A

## 2017-05-14 PROCEDURE — 74011250636 HC RX REV CODE- 250/636: Performed by: OBSTETRICS & GYNECOLOGY

## 2017-05-14 PROCEDURE — 75410000003 HC RECOV DEL/VAG/CSECN EA 0.5 HR: Performed by: OBSTETRICS & GYNECOLOGY

## 2017-05-14 PROCEDURE — 77030002966 HC SUT PDS J&J -A

## 2017-05-14 PROCEDURE — 77030008467 HC STPLR SKN COVD -B

## 2017-05-14 PROCEDURE — 76010000392 HC C SECN EA ADDL 0.5 HR: Performed by: OBSTETRICS & GYNECOLOGY

## 2017-05-14 PROCEDURE — 36415 COLL VENOUS BLD VENIPUNCTURE: CPT | Performed by: OBSTETRICS & GYNECOLOGY

## 2017-05-14 PROCEDURE — 77030032490 HC SLV COMPR SCD KNE COVD -B

## 2017-05-14 RX ORDER — PHENYLEPHRINE 10 MG/250 ML(40 MCG/ML)IN 0.9 % SOD.CHLORIDE INTRAVENOUS
Status: DISPENSED
Start: 2017-05-14 | End: 2017-05-14

## 2017-05-14 RX ORDER — ONDANSETRON 2 MG/ML
4 INJECTION INTRAMUSCULAR; INTRAVENOUS
Status: ACTIVE | OUTPATIENT
Start: 2017-05-14 | End: 2017-05-15

## 2017-05-14 RX ORDER — NALOXONE HYDROCHLORIDE 0.4 MG/ML
0.4 INJECTION, SOLUTION INTRAMUSCULAR; INTRAVENOUS; SUBCUTANEOUS AS NEEDED
Status: DISCONTINUED | OUTPATIENT
Start: 2017-05-14 | End: 2017-05-17 | Stop reason: HOSPADM

## 2017-05-14 RX ORDER — OXYTOCIN/RINGER'S LACTATE 20/1000 ML
125-1000 PLASTIC BAG, INJECTION (ML) INTRAVENOUS AS NEEDED
Status: DISCONTINUED | OUTPATIENT
Start: 2017-05-14 | End: 2017-05-17 | Stop reason: HOSPADM

## 2017-05-14 RX ORDER — OXYCODONE AND ACETAMINOPHEN 5; 325 MG/1; MG/1
1 TABLET ORAL
Status: DISCONTINUED | OUTPATIENT
Start: 2017-05-14 | End: 2017-05-17 | Stop reason: HOSPADM

## 2017-05-14 RX ORDER — ONDANSETRON 2 MG/ML
INJECTION INTRAMUSCULAR; INTRAVENOUS AS NEEDED
Status: DISCONTINUED | OUTPATIENT
Start: 2017-05-14 | End: 2017-05-14 | Stop reason: HOSPADM

## 2017-05-14 RX ORDER — SODIUM CHLORIDE 0.9 % (FLUSH) 0.9 %
5-10 SYRINGE (ML) INJECTION EVERY 8 HOURS
Status: DISCONTINUED | OUTPATIENT
Start: 2017-05-14 | End: 2017-05-14

## 2017-05-14 RX ORDER — OXYTOCIN/RINGER'S LACTATE 20/1000 ML
PLASTIC BAG, INJECTION (ML) INTRAVENOUS
Status: DISPENSED
Start: 2017-05-14 | End: 2017-05-14

## 2017-05-14 RX ORDER — KETOROLAC TROMETHAMINE 30 MG/ML
30 INJECTION, SOLUTION INTRAMUSCULAR; INTRAVENOUS
Status: DISPENSED | OUTPATIENT
Start: 2017-05-14 | End: 2017-05-15

## 2017-05-14 RX ORDER — SODIUM CHLORIDE 0.9 % (FLUSH) 0.9 %
5-10 SYRINGE (ML) INJECTION AS NEEDED
Status: DISCONTINUED | OUTPATIENT
Start: 2017-05-14 | End: 2017-05-17 | Stop reason: HOSPADM

## 2017-05-14 RX ORDER — SODIUM CHLORIDE, SODIUM LACTATE, POTASSIUM CHLORIDE, CALCIUM CHLORIDE 600; 310; 30; 20 MG/100ML; MG/100ML; MG/100ML; MG/100ML
INJECTION, SOLUTION INTRAVENOUS
Status: DISCONTINUED | OUTPATIENT
Start: 2017-05-14 | End: 2017-05-14 | Stop reason: HOSPADM

## 2017-05-14 RX ORDER — ACETAMINOPHEN 325 MG/1
650 TABLET ORAL
Status: DISCONTINUED | OUTPATIENT
Start: 2017-05-14 | End: 2017-05-17 | Stop reason: HOSPADM

## 2017-05-14 RX ORDER — MORPHINE SULFATE 1 MG/ML
INJECTION, SOLUTION EPIDURAL; INTRATHECAL; INTRAVENOUS AS NEEDED
Status: DISCONTINUED | OUTPATIENT
Start: 2017-05-14 | End: 2017-05-14 | Stop reason: HOSPADM

## 2017-05-14 RX ORDER — SODIUM CHLORIDE 0.9 % (FLUSH) 0.9 %
5-10 SYRINGE (ML) INJECTION EVERY 8 HOURS
Status: DISCONTINUED | OUTPATIENT
Start: 2017-05-14 | End: 2017-05-17 | Stop reason: HOSPADM

## 2017-05-14 RX ORDER — SODIUM CHLORIDE 0.9 % (FLUSH) 0.9 %
SYRINGE (ML) INJECTION
Status: DISPENSED
Start: 2017-05-14 | End: 2017-05-14

## 2017-05-14 RX ORDER — HYDROCORTISONE 1 %
CREAM (GRAM) TOPICAL AS NEEDED
Status: DISCONTINUED | OUTPATIENT
Start: 2017-05-14 | End: 2017-05-17 | Stop reason: HOSPADM

## 2017-05-14 RX ORDER — IBUPROFEN 400 MG/1
800 TABLET ORAL EVERY 8 HOURS
Status: DISCONTINUED | OUTPATIENT
Start: 2017-05-15 | End: 2017-05-17 | Stop reason: HOSPADM

## 2017-05-14 RX ORDER — SODIUM CHLORIDE, SODIUM LACTATE, POTASSIUM CHLORIDE, CALCIUM CHLORIDE 600; 310; 30; 20 MG/100ML; MG/100ML; MG/100ML; MG/100ML
125 INJECTION, SOLUTION INTRAVENOUS CONTINUOUS
Status: DISCONTINUED | OUTPATIENT
Start: 2017-05-14 | End: 2017-05-17 | Stop reason: HOSPADM

## 2017-05-14 RX ORDER — DIPHENHYDRAMINE HCL 25 MG
25 CAPSULE ORAL
Status: DISCONTINUED | OUTPATIENT
Start: 2017-05-14 | End: 2017-05-17 | Stop reason: HOSPADM

## 2017-05-14 RX ORDER — DOCUSATE SODIUM 100 MG/1
100 CAPSULE, LIQUID FILLED ORAL
Status: DISCONTINUED | OUTPATIENT
Start: 2017-05-14 | End: 2017-05-17 | Stop reason: HOSPADM

## 2017-05-14 RX ORDER — ONDANSETRON 2 MG/ML
4 INJECTION INTRAMUSCULAR; INTRAVENOUS
Status: DISCONTINUED | OUTPATIENT
Start: 2017-05-14 | End: 2017-05-17 | Stop reason: HOSPADM

## 2017-05-14 RX ORDER — ZOLPIDEM TARTRATE 5 MG/1
5 TABLET ORAL
Status: DISCONTINUED | OUTPATIENT
Start: 2017-05-14 | End: 2017-05-17 | Stop reason: HOSPADM

## 2017-05-14 RX ORDER — OXYTOCIN/RINGER'S LACTATE 20/1000 ML
PLASTIC BAG, INJECTION (ML) INTRAVENOUS
Status: DISCONTINUED | OUTPATIENT
Start: 2017-05-14 | End: 2017-05-14 | Stop reason: HOSPADM

## 2017-05-14 RX ORDER — MORPHINE SULFATE 10 MG/ML
10 INJECTION, SOLUTION INTRAMUSCULAR; INTRAVENOUS
Status: ACTIVE | OUTPATIENT
Start: 2017-05-14 | End: 2017-05-15

## 2017-05-14 RX ORDER — AMMONIA 15 % (W/V)
1 AMPUL (EA) INHALATION AS NEEDED
Status: DISCONTINUED | OUTPATIENT
Start: 2017-05-14 | End: 2017-05-17 | Stop reason: HOSPADM

## 2017-05-14 RX ORDER — SIMETHICONE 80 MG
80 TABLET,CHEWABLE ORAL
Status: DISCONTINUED | OUTPATIENT
Start: 2017-05-14 | End: 2017-05-17 | Stop reason: HOSPADM

## 2017-05-14 RX ORDER — OXYCODONE AND ACETAMINOPHEN 5; 325 MG/1; MG/1
2 TABLET ORAL
Status: DISCONTINUED | OUTPATIENT
Start: 2017-05-14 | End: 2017-05-17 | Stop reason: HOSPADM

## 2017-05-14 RX ORDER — NALBUPHINE HYDROCHLORIDE 10 MG/ML
2.5 INJECTION, SOLUTION INTRAMUSCULAR; INTRAVENOUS; SUBCUTANEOUS
Status: ACTIVE | OUTPATIENT
Start: 2017-05-14 | End: 2017-05-15

## 2017-05-14 RX ORDER — NALOXONE HYDROCHLORIDE 0.4 MG/ML
0.4 INJECTION, SOLUTION INTRAMUSCULAR; INTRAVENOUS; SUBCUTANEOUS AS NEEDED
Status: DISCONTINUED | OUTPATIENT
Start: 2017-05-14 | End: 2017-05-14 | Stop reason: SDUPTHER

## 2017-05-14 RX ORDER — BUPIVACAINE HYDROCHLORIDE 7.5 MG/ML
INJECTION, SOLUTION EPIDURAL; RETROBULBAR AS NEEDED
Status: DISCONTINUED | OUTPATIENT
Start: 2017-05-14 | End: 2017-05-14 | Stop reason: HOSPADM

## 2017-05-14 RX ADMIN — SODIUM CHLORIDE, SODIUM LACTATE, POTASSIUM CHLORIDE, AND CALCIUM CHLORIDE 125 ML/HR: 600; 310; 30; 20 INJECTION, SOLUTION INTRAVENOUS at 21:52

## 2017-05-14 RX ADMIN — ONDANSETRON 4 MG: 2 INJECTION INTRAMUSCULAR; INTRAVENOUS at 12:00

## 2017-05-14 RX ADMIN — KETOROLAC TROMETHAMINE 30 MG: 30 INJECTION, SOLUTION INTRAMUSCULAR at 20:28

## 2017-05-14 RX ADMIN — SODIUM CHLORIDE, SODIUM LACTATE, POTASSIUM CHLORIDE, AND CALCIUM CHLORIDE 125 ML/HR: 600; 310; 30; 20 INJECTION, SOLUTION INTRAVENOUS at 06:30

## 2017-05-14 RX ADMIN — Medication: at 12:38

## 2017-05-14 RX ADMIN — ONDANSETRON 4 MG: 2 INJECTION INTRAMUSCULAR; INTRAVENOUS at 21:02

## 2017-05-14 RX ADMIN — CEFAZOLIN 3 G: 1 INJECTION, POWDER, FOR SOLUTION INTRAMUSCULAR; INTRAVENOUS; PARENTERAL at 11:25

## 2017-05-14 RX ADMIN — MORPHINE SULFATE 200 MCG: 1 INJECTION, SOLUTION EPIDURAL; INTRATHECAL; INTRAVENOUS at 11:40

## 2017-05-14 RX ADMIN — Medication: at 12:09

## 2017-05-14 RX ADMIN — SODIUM CHLORIDE, SODIUM LACTATE, POTASSIUM CHLORIDE, AND CALCIUM CHLORIDE 125 ML/HR: 600; 310; 30; 20 INJECTION, SOLUTION INTRAVENOUS at 13:31

## 2017-05-14 RX ADMIN — KETOROLAC TROMETHAMINE 30 MG: 30 INJECTION, SOLUTION INTRAMUSCULAR at 13:27

## 2017-05-14 RX ADMIN — BUPIVACAINE HYDROCHLORIDE 12 MG: 7.5 INJECTION, SOLUTION EPIDURAL; RETROBULBAR at 11:40

## 2017-05-14 RX ADMIN — SODIUM CHLORIDE, SODIUM LACTATE, POTASSIUM CHLORIDE, CALCIUM CHLORIDE: 600; 310; 30; 20 INJECTION, SOLUTION INTRAVENOUS at 11:36

## 2017-05-14 NOTE — PROGRESS NOTES
OB HOSPITALIST    In to meet patient, received sign out from Dr. Maggie Calhoun this AM.  Patient is a 31 yo  @ 40 1/7 wks with preE without severe features who was scheduled for a  tomorrow due to suspected macrosomia (9#10oz @ 39 5/7wks). She had SROM @ 0415 this AM and then decided on a ALEXANDER. Visit Vitals    /89    Pulse (!) 104    Temp 98.1 °F (36.7 °C)    Resp 16    Ht 5' 9\" (1.753 m)    Wt 142.4 kg (314 lb)    Breastfeeding No    BMI 46.37 kg/m2     FHTs baseline 130, moderate variability, accels present, decels absent  TOCO rare contractions  Cx deferred    ABD soft, obese, gravid, Leopold EFW >10#    Discussed with patient ALEXANDER vs PLTCS. We discussed the potential for shoulder dystocia, use of maneuvers to relieve shoulder dystocia, potential for temporary and permanent neurological deficits associated with dystocia. We also discussed the potential of her being able to labor and delivery a LGA infant without difficulty but as a P0 we have no way of knowing the capabilities of her pelvis. We reviewed trying to avoid c-sections in general and with nullips in particular as they have implications for future deliveries. We discussed in detail the risks associated with c/s; bleeding, infection, injury to surrounding organs including fetus, blood clots, etc.      After our discussion, patient stated that she wanted to do whatever would be safest for the baby. We reviewed that vaginal delivery was probably safest for her while  was probably safest for the baby. She elects to move forward with . Anesthesia has been notified. Will move forward with  when they are ready. Mother and baby are doing well at this time.

## 2017-05-14 NOTE — H&P
EDC:2017  EGA: 37 weeks, 1 days      History of Present Illness:  33 y/o  @ 37 1w  day with Preeclampsia w/o severe features ansd Suspected macrosomia presents to L&d for evaluation of SROM. She was recently discharged from hospital yesterday and is scheduled for Primary  section tomorrow. She is now contemplating a trial of labor. Patient's Prenatal Care with Doctor of Record Bry Castorena MD Notable For -    Transient HTN in pregnancy  Decreased fetal movement  Anemia on FE pregnant  LGA ____ >99%ile at 24wks &28wks  Obesity in pregnancy BMI>34.99-FS ____, BMI 44, FS ___, growth q4wk ___, wkly surveillance 36wk __, consider delivery by 40wk   lab screening  Normal pregnancy primigravida  Family History Breast Cancer          Impression & Recommendations:    Problem # 1:  Transient HTN in pregnancy (ICD-642.33) (FAF80-R95.3)  Preeclampsia  by 24 hour urine of 550mg/24 hour and headache    Problem # 2:  SROM  c/o leaking fluid     Problem # 3:  LGA ____ >99%ile at 24wks &28wks (VRA-295.60) (NYV48-C76.62x0)    Problem # 4:  Obesity in pregnancy BMI>34.99-FS ____, BMI 44, FS ___, growth q4wk ___, wkly surveillance 36wk __, consider delivery by [place] (WLF-393.27) (EXD20-W17.213)      Past Pregnancy History      :  2     Term Births:  0     Premature Births: 0     Living Children: 0     Para:   0     Prev : 0     Aborta:  1     Elect. Ab:  0     Spont.  Ab:  1     Ectopics:  0    Pregnancy # 1     Comments:  Miscarried May 2016    Pregnancy # 2     Comments:  current        Past Medical History:     Reviewed history from 2016 and no changes required:        Anxiety    Past Surgical History:     Reviewed history from 2016 and no changes required:        wisdom teeth    Family History Summary:      Reviewed history Last on 2017 and no changes required:2017  MGM - Has Family History of Breast Cancer - Entered On: 2014    General Comments - FH:  Family history [place] [facility] [place]        Social History:     Reviewed history from 10/17/2016 and no changes required:                Works at "[employer]                Smoking History:        Patient is a former smoker.       Previous Tobacco Use: Signed On - 10/17/2016  Smoked Tobacco Use:  Former smoker     Cigarettes:  Yes  Smokeless Tobacco Use:  Never     Counseled to quit/cut down:  yes  Passive smoke exposure:  yes  Drug use:  no  HIV high-risk behavior:  no  Caffeine use:  1 drinks per day    Previous Alcohol Use: Signed On - 10/17/2016  Alcohol use:  no  Exercise:  no  Seatbelt use:  preg- %  Sun Exposure:  rarely    PAP Smear History:     Date of Last PAP Smear:  10/17/2016      Review of Systems        See HPI    Allergies      Medications Removed from Medication List        Flowsheet View for Follow-up Visit     Estimated weeks of        gestation:  37 1/7     Weight:  314.5     Blood pressure: 132 / 90     Fetal position:  vertex     Cx Dilation:  2cm     Cx Effacement: 90%     Cx Station:  -2      Vital Signs    Blood Pressure: 132 / 80  FHT Descrip:    good BTBV  Contractions:  yes  UC Frequency:  q 5 min    UC Intensity:  moderate   NST:   reactive     Height:   69 inches  Weight:  314.5 pounds      Physical Exam     General           General appearance:  no acute distress    Head           Inspection:   normal    Eyes           External:   EOM intact    ENT           Dental:   adequate dentition    Chest           Lungs:  clear to auscultation          Heart:  regular rate and rhythm    Extremeties           Extremeties:  0 edema    Neurological           Reflexes:  2+ and symmetric with no pathological reflexes    Psych           Orientation:  oriented to time, place, and person          Mood:  no appearance of anxiety, depression, or agitation    Lymph           Inguinal:  no inguinal adenopathy    Skin           Inspection:  no rashes, suspicious lesions, or ulcerations    Abdomen           Abdomen:  gravid    Pelvic Exam           EGBUS:  no lesions          Vagina:  normal appearing without lesions or discharge          Uterus:  gravid          Adnexa:  non palpable          Cervix:  no lesions or discharge                  Dilation: : 2cm                  Effacement:  90%                  Station:  -2                  Presentation:  vertex            Impression & Recommendations:    Problem # 1:  Transient HTN in pregnancy (BYY-526.46) (PXH92-S24.3)  Preeclampsia  by 24 hour urine of 550mg/24 hour and headache    Problem # 2:  SROM  c/o leaking fluid     Problem # 3:  LGA ____ >99%ile at 24wks &28wks (XAH-270.75) (ZUT43-Y41.62x0)    Problem # 4:  Obesity in pregnancy BMI>34.99-FS ____, BMI 44, FS ___, growth q4wk ___, wkly surveillance 36wk __, consider delivery by 40wk (XAK-925.85) (XAS96-E05.213)      Medications (at conclusion of this visit)    10/17/2016 PRENATAL FORMULA CAPS (PRENATAL VIT-FE FUM-FA-OMEGA CAPS)           LABORATORY DATA   TEST DATE RESULT   Group B Strep culture                                     (Group B Strep Culture Result Field)   Blood Type 10/17/2016 O                                             (Blood Type Result Field)   Rh 10/17/2016 Positive                                   (Rh Result Field)   Rhogam Inj Given     Tdap Vaccine Given 03/14/2017 Vacc.  [address]   Antibody Screen 03/14/2017 Negative   Rubella  [facility] Reference Ranges On or After 3/10/14                  <0.90              Non-immune      0.90 - 0.99     Equivocal      >0.99              Immune    [facility] Reference Ranges  Before 3/10/14           <5                 Non-immune             5 - 9               Equivocal            >9                 Immune  [facility] Reference Ranges       < Or = 0.90       Negative             0.91-1.09          Equivocal            > Or = 1.10       Positive   10/17/2016     2.05     TPA (T Pallidum Antibodies) 03/14/2017 Negative   Serology (RPR)     HBsAg 10/17/2016 Negative   HIV 10/17/2016 Non Reactive   Hemoglobin 03/14/2017 11.0   Hematocrit 03/14/2017 32.6   Platelets 64/97/1808 276 X10E3/UL   TSH     Urine Culture 10/17/2016 Negative   GC DNA Probe 10/17/2016 Negative   Chlamydia DNA 10/17/2016 Negative   PAP 10/17/2016 NIL   Flu Vaccine Given 10/17/2016 Vacc. VWC   HGBA1C     HGB Electro     T4, Free     BG Fasting     GTT 1H 50G 03/14/2017 119   GTT 1H 100G     GTT 2H 100G     GTT 3H 100G     Glucose Plasma     CF Accept or Decline 10/17/2016 Declined   CF Screen Result 10/17/2016 Declined   Nuchal Trans 02/14/2017 6.92^6. 92 mm&millimeters   AFP Only     Tetra 01/19/2017 *Screen Negative*   AFP Serum     CVS 10/17/2016 declined   AFP Amniotic     Amnio Karyo     FISH     GC Culture     Chlamydia Cult     Ureaplasma     Mycoplasma     WBC 10/17/2016 9.6 X10E3/UL   RBC 10/17/2016 5.07 X10E6/UL   MCV 10/17/2016 79   MCH 10/17/2016 25.4   MCHC RBC 10/17/2016 32.1     ULTRASOUND DATA   TEST DATE RESULT   Estimated Fetal Weight 05/11/2017 9012^7056 g&grams                                     Weight % 05/11/2017 100^>99% %&percent                                                LIZETTE 05/11/2017 91.14^58.9 cm&centimeters                    BPP 05/11/2017 8^8 [n/a]&Not applicable   Cervical Length (mm)             Electronically signed by Jossie Fatima on 05/14/2017 at 6:46 AM    ________________________________________________________________________

## 2017-05-14 NOTE — L&D DELIVERY NOTE
Delivery Summary  Patient: Cady Silva             Circumcision:   desires  Additional Delivery Comments - \"Norrin\"; Macrosomic @ 37 wks (10#4oz); preE without severe features      Information for the patient's :  Raquel Ness [[id number]]       Labor Events:    Labor: No   Rupture Date: 2017   Rupture Time: 4:15 AM   Rupture Type SROM   Amniotic Fluid Volume: Moderate    Amniotic Fluid Description: Clear     Induction: None       Augmentation: None   Labor Events: None     Cervical Ripening:     None     Delivery Events:  Episiotomy: None   Laceration(s): None     Repaired:      Number of Repair Packets:     Suture Type and Size:       Estimated Blood Loss (ml): 800ml       Delivery Date: 2017    Delivery Time: 12:08 PM  Delivery Type: , Low Transverse  Sex:  Male     Gestational Age: 42w4d   Delivery Clinician:  Augusto Canavan  Living Status: Yes   Delivery Location: L&D            APGARS  One minute Five minutes Ten minutes   Skin color: 0   1   1     Heart rate: 1   2   2     Grimace: 0   1   2     Muscle tone: 0   1   2     Breathin   1   1     Totals: 1   6   8         Presentation: Vertex    Position:        Resuscitation Method:  C-PAP; Oxygen;PPV;Suctioning-bulb; Tactile Stimulation; Other (Comment) PPV x45 sec then followed by CPAP   Meconium Stained: None      Cord Vessels: 3 Vessels      Cord Events:    Cord Blood Sent?:  Yes    Blood Gases Sent?:  Yes    Placenta:  Date/Time:  12:09 PM  Removal: Expressed      Appearance: Normal     [place] Measurements:  Birth Weight: 4.66 kg      Birth Length: 55.9 cm      Head Circumference: 36 cm      Chest Circumference:       Abdominal Girth: 34.5 cm    Other Providers:   NEENA ANDREWS;NELLY SUÁREZ;KERVIN SCHAFER;SHAHAB LOCKHART;LILY LI;MAKAYLA BURGOS;CORY SCHULER, Obstetrician;Primary Nurse;Primary  Nurse;Nicu Nurse;Neonatologist;Anesthesiologist;Respiratory Therapist;Nurse Practitioner;Midwife;Nursery Nurse           Cord pH:  7.119    Episiotomy: None   Laceration(s): None      Estimated Blood Loss (ml): 800    Labor Events  Method: None       Augmentation: None   Cervical Ripening:       None        Operative Vaginal Delivery - none    Group B Strep:   Lab Results   Component Value Date/Time    GrBStrep, External negative 2017     Information for the patient's :  Gutierrez Ceballos [[id number]]     Lab Results   Component Value Date/Time    ABO/Rh(D) O NEGATIVE 2017 12:32 PM    ISADORA IgG NEG 2017 12:32 PM    Bilirubin if ISADORA pos: IF DIRECT SYED POSITIVE, BILIRUBIN TO FOLLOW 2017 12:32 PM     No results found for: APH, APCO2, APO2, AHCO3, ABEC, ABDC, O2ST, EPHV, PCO2V, PO2V, HCO3V, EBEV, EBDV, SITE, RSCOM

## 2017-05-14 NOTE — ANESTHESIA PREPROCEDURE EVALUATION
Anesthetic History   No history of anesthetic complications            Review of Systems / Medical History  Patient summary reviewed, nursing notes reviewed and pertinent labs reviewed    Pulmonary  Within defined limits                 Neuro/Psych   Within defined limits           Cardiovascular  Within defined limits                     GI/Hepatic/Renal  Within defined limits              Endo/Other        Morbid obesity     Other Findings              Physical Exam    Airway  Mallampati: II  TM Distance: > 6 cm  Neck ROM: normal range of motion   Mouth opening: Normal     Cardiovascular  Regular rate and rhythm,  S1 and S2 normal,  no murmur, click, rub, or gallop             Dental    Dentition: Upper dentition intact and Lower dentition intact     Pulmonary  Breath sounds clear to auscultation               Abdominal  GI exam deferred       Other Findings            Anesthetic Plan    ASA: 3  Anesthesia type: spinal      Post-op pain plan if not by surgeon: intrathecal opiates      Anesthetic plan and risks discussed with: Patient

## 2017-05-14 NOTE — ANESTHESIA POSTPROCEDURE EVALUATION
Post-Anesthesia Evaluation and Assessment    Patient: Allison Hoskins MRN: [id number]  SSN: [id number]    YOB: 1990  Age: 32 y.o. Sex: female       Cardiovascular Function/Vital Signs  Visit Vitals    /70    Pulse 81    Temp 36.4 °C (97.6 °F)    Resp 16    Ht 5' 9\" (1.753 m)    Wt 142.4 kg (314 lb)    SpO2 99%    Breastfeeding No    BMI 46.37 kg/m2       Patient is status post spinal anesthesia for Procedure(s):   SECTION. Nausea/Vomiting: None    Postoperative hydration reviewed and adequate. Pain:  Pain Scale 1: Numeric (0 - 10) (17 1320)  Pain Intensity 1: 7 (17 1320)   Managed    Neurological Status:   Neuro (WDL): Within Defined Limits (17 1320)   At baseline    Mental Status and Level of Consciousness: Arousable    Pulmonary Status:   O2 Device: Room air (17 1320)   Adequate oxygenation and airway patent    Complications related to anesthesia: None    Post-anesthesia assessment completed.  No concerns    Signed By: Melida Fine MD     May 14, 2017

## 2017-05-14 NOTE — ANESTHESIA PROCEDURE NOTES
Spinal Block    Start time: 5/14/2017 11:36 AM  End time: 5/14/2017 11:40 AM  Performed by: Manuelito Best  Authorized by: Manuelito Best     Pre-procedure:   Indications: primary anesthetic  Preanesthetic Checklist: patient identified, risks and benefits discussed, anesthesia consent, site marked, patient being monitored and timeout performed    Timeout Time: 11:36          Spinal Block:   Patient Position:  Seated  Prep Region:  Lumbar  Prep: Betadine and patient draped      Location:  L3-4  Technique:  Single shot  Local:  Lidocaine 1%      Needle:   Needle Type:  Pencil-tip  Needle Gauge:  25 G  Attempts:  1      Events: CSF confirmed, no blood with aspiration and no paresthesia        Assessment:  Insertion:  Uncomplicated  Patient tolerance:  Patient tolerated the procedure well with no immediate complications

## 2017-05-14 NOTE — OP NOTES
Operative Note    Name: [facility] Record Number: [id number]   YOB: 1990  Today's Date: May 14, 2017      Pre-operative Diagnosis: 1.  @ 37 1/7 wks with SROM      2. Pre-eclampsia without severe features      3. Suspected macrosomia     Post-operative Diagnosis: 1.  @ 37 1/7 wks with SROM      2. Pre-eclampsia without severe features      3. Fetal macrosomia--VMI 10#4oz    Operation: Low Transverse  SECTION    Surgeon(s):  MD Rosalia Sesay MD--assistant    Anesthesia: Spinal    Prophylactic Antibiotics: Ancef  DVT Prophylaxis: Sequential Compression Devices         Fetal Description: box     Birth Information:   Information for the patient's :  Bi Salazar [[id number]]   Delivery of a 4.66 kg Male [2] infant on 2017 at 12:08 PM. Apgars were 1 and 6. Umbilical Cord:     Umbilical Cord Events:     Placenta:  removal with  appearance. Amniotic Fluid Volume: Moderate     Amniotic Fluid Description:  Clear        Umbilical Cord: 3 vessels present and Cord blood sent to lab for type, Rh, and Irene' test    Placenta:  expressed    Specimens: cord pH 0.570           Complications:  none    Procedure Detail:      After proper patient identification and consent, the patient was taken to the operating room, where spinal anesthesia was administered and found to be adequate. Merrill catheter had been placed using sterile technique. The patient was prepped and draped in the normal sterile fashion. The abdomen was entered using the Pfannenstiel technique. The peritoneum was entered sharply well superior to the bladder without any apparent injury. The bladder flap was created without difficulty. A low transverse uterine incision was made with the scalpel and extended sharply with bandage scissors. Amniotomy was performed and the fluid was small amount clear.   The babys head was then delivered after further extending the hysterotomy and with assistance of the kiwi vacuum (1 pull, no pop off). The nose and mouth were suctioned. The cord was clamped and cut and the baby was handed off to Nursing staff in attendance. NICU was later called due to low oxygen sats. Placenta was then expressed from the uterus. The uterus was curettaged with a moist lap pad and cleared of all clots and debris. The uterine incision was closed with 0 monocryl, single layer in running locking fashion with good hemostasis assured followed by a 2nd embricating stitch. The anterior pelvis was irrigated with warm normal saline and good hemostasis was again reassured throughout. The peritoneum was closed with 2.0 monocryl as well as the rectus bellies. The fascia was closed with 0 PDS in a running fashion. Good hemostasis was assured. The subcutaneous tissues were irrigated, made hemostatic with the bovie and closed with 2.0 plain gut. The skin was closed with a staple closure. A wound vacuum was placed. The patient tolerated the procedure well. Sponge, lap, and needle counts were correct times three and the patient was taken to recovery/postpartum room in stable condition. The baby was transported to the [facility] for further observation.     Ryan West MD  May 14, 2017  2:57 PM

## 2017-05-14 NOTE — IP AVS SNAPSHOT
Current Discharge Medication List  
  
START taking these medications Dose & Instructions Dispensing Information Comments Morning Noon Evening Bedtime  
 ibuprofen 800 mg tablet Commonly known as:  MOTRIN Your last dose was: Your next dose is:    
   
   
 Dose:  800 mg Take 1 Tab by mouth every eight (8) hours as needed for Pain. Quantity:  30 Tab Refills:  0  
     
   
   
   
  
 oxyCODONE-acetaminophen 5-325 mg per tablet Commonly known as:  PERCOCET Your last dose was: Your next dose is:    
   
   
 Dose:  2 Tab Take 2 Tabs by mouth every four (4) hours as needed. Max Daily Amount: 12 Tabs. Quantity:  30 Tab Refills:  0 CONTINUE these medications which have NOT CHANGED Dose & Instructions Dispensing Information Comments Morning Noon Evening Bedtime PNV No12-Iron-FA-DSS-OM-3 29 mg iron-1 mg -50 mg Cpkd Your last dose was: Your next dose is: Take  by mouth. Refills:  0 Where to Get Your Medications Information on where to get these meds will be given to you by the nurse or doctor. ! Ask your nurse or doctor about these medications  
  ibuprofen 800 mg tablet  
 oxyCODONE-acetaminophen 5-325 mg per tablet

## 2017-05-14 NOTE — PROGRESS NOTES
1100 Bedside report from LISSETTE Coleman RN. Assumed care of patient    [id number] Patient ambulatory to OR 1 for primary c/s with Dr. June Macario. 1134 in OR 1    1316 Patient transferred via hospital bed to LD room 7 after primary c/s. Infant in NBN for observation. 1550 TRANSFER - OUT REPORT:    Verbal report given to DMITRIY Schmitz RN(name) on Carlos Turcios  being transferred to (unit) for routine progression of care       Report consisted of patients Situation, Background, Assessment and   Recommendations(SBAR). Information from the following report(s) SBAR, Kardex, OR Summary, Procedure Summary, Intake/Output, MAR and Recent Results was reviewed with the receiving nurse. Lines:   Peripheral IV 05/14/17 Left Forearm (Active)   Site Assessment Clean, dry, & intact 5/14/2017  8:09 AM   Phlebitis Assessment 0 5/14/2017  8:09 AM   Infiltration Assessment 0 5/14/2017  8:09 AM   Dressing Status Clean, dry, & intact 5/14/2017  8:09 AM   Dressing Type Tape;Transparent 5/14/2017  8:09 AM   Hub Color/Line Status Pink;Capped 5/14/2017  8:09 AM       Peripheral IV 05/14/17 Right Hand (Active)        Opportunity for questions and clarification was provided.       Patient transported with:   Registered Nurse

## 2017-05-14 NOTE — PROGRESS NOTES
0740-Bedside shift change report given to LISSETTE Holly RN (oncoming nurse) by Sushila Bush (offgoing nurse). Report included the following information SBAR, MAR and Recent Results. 0956-Dr Cristobal Parekh at the bedside discussing plan of care    1008-plan is for c section    1017-Dr Soheila Ortega at the bedside discussing spinal procedure    1055-Bedside shift change report given to SAGE Quiroga RN (oncoming nurse) by LISSETTE Holly RN (offgoing nurse). Report included the following information SBAR, MAR and Recent Results.

## 2017-05-14 NOTE — IP AVS SNAPSHOT
[address] 
[phone] Patient: Suszanne Cooks MRN: [id number] You are allergic to the following No active allergies Immunizations Administered for This Admission Name Date MMR  Deferred () Tdap  Deferred () Recent Documentation Height Weight Breastfeeding? BMI OB Status Smoking Status 1.753 m 142.4 kg No 46.37 kg/m2 Pregnant Former Smoker Unresulted Labs Order Current Status SAMPLE TO BLOOD BANK In process Emergency Contacts Name Discharge Info Relation Home Work Mobile Fina Otero  Parent [1] [phone] Miller Smith DISCHARGE CAREGIVER [3] Spouse [3] [phone] About your hospitalization You were admitted on:  May 14, 2017 You last received care in the:  [address] [facility] You were discharged on:  May 17, 2017 Unit phone number:  [phone] Why you were hospitalized Your primary diagnosis was:  Not on File Your diagnoses also included:  Macrosomia, Preeclampsia, Prom (Premature Rupture Of Membranes), Maternal Obesity Affecting Pregnancy, Antepartum, Single Delivery By , Anemia Providers Seen During Your Hospitalizations Provider Role Specialty Primary office phone Kenton Villeda MD Attending Provider Obstetrics & Gynecology [phone] Your Primary Care Physician (PCP) Primary Care Physician Office Phone Office Fax Mina Galan [phone] Follow-up Information Follow up With Details Comments Contact Info William Sadler MD Schedule an appointment as soon as possible for a visit in 1 week For wound re-check, BP check, EPDS check [address] [facility] 
[phone] Current Discharge Medication List  
  
START taking these medications Dose & Instructions Dispensing Information Comments Morning Noon Evening Bedtime  
 ibuprofen 800 mg tablet Commonly known as:  MOTRIN Your last dose was: Your next dose is:    
   
   
 Dose:  800 mg Take 1 Tab by mouth every eight (8) hours as needed for Pain. Quantity:  30 Tab Refills:  0  
     
   
   
   
  
 oxyCODONE-acetaminophen 5-325 mg per tablet Commonly known as:  PERCOCET Your last dose was: Your next dose is:    
   
   
 Dose:  2 Tab Take 2 Tabs by mouth every four (4) hours as needed. Max Daily Amount: 12 Tabs. Quantity:  30 Tab Refills:  0 CONTINUE these medications which have NOT CHANGED Dose & Instructions Dispensing Information Comments Morning Noon Evening Bedtime PNV No12-Iron-FA-DSS-OM-3 29 mg iron-1 mg -50 mg Cpkd Your last dose was: Your next dose is: Take  by mouth. Refills:  0 Where to Get Your Medications Information on where to get these meds will be given to you by the nurse or doctor. ! Ask your nurse or doctor about these medications  
  ibuprofen 800 mg tablet  
 oxyCODONE-acetaminophen 5-325 mg per tablet Discharge Instructions POSTPARTUM DISCHARGE INSTRUCTIONS Name:  Suszanne Cooks YOB: 1990 Admission Diagnosis:  PROM (premature rupture of membranes) Preeclampsia Maternal obesity affecting pregnancy, antepartum Macrosomia Primary, Elective, Suspected Macrosomia Discharge Diagnosis:   
Problem List as of 2017  Date Reviewed: 2017 Codes Class Noted - Resolved Single delivery by  ICD-10-CM: O82 
ICD-9-CM: 669.71  2017 - Present Anemia ICD-10-CM: D64.9 ICD-9-CM: 285.9  2017 - Present Preeclampsia ICD-10-CM: O14.90 ICD-9-CM: 642.40  2017 - Present Maternal obesity affecting pregnancy, antepartum ICD-10-CM: O99.210 ICD-9-CM: 649.13  2017 - Present RESOLVED: Macrosomia ICD-10-CM: P08.0 ICD-9-CM: 766.0  2017 - 2017 RESOLVED: PROM (premature rupture of membranes) ICD-10-CM: O42.90 ICD-9-CM: 658.10  2017 - 2017 Attending Physician:  Aureliano Lizarraga MD 
 
Delivery Type:   [facility]: [facility] Your Recovery: A  section, or , is surgery to deliver your baby through a cut, called an incision that the doctor makes in your lower belly and uterus. You may have some pain in your lower belly and need pain medicine for 1 to 2 weeks. You can expect some vaginal bleeding for several weeks. You will probably need about 6 weeks to fully recover. It is important to take it easy while the incision is healing. Avoid heavy lifting, strenuous activities, or exercises that strain the belly muscles while you are recovering. Ask a family member or friend for help with housework, cooking, and shopping. This care sheet gives you a general idea about how long it will take for you to recover. But each person recovers at a different pace. Follow the steps below to get better as quickly as possible. How can you care for yourself at home? Activity · Rest when you feel tired. Getting enough sleep will help you recover. · Try to walk each day. Start by walking a little more than you did the day before. Bit by bit, increase the amount you walk. Walking boosts blood flow and helps prevent pneumonia, constipation, and blood clots. · Avoid strenuous activities, such as bicycle riding, jogging, weightlifting, and aerobic exercise, for 6 weeks or until your doctor says it is okay. · Until your doctor says it is okay, do not lift anything heavier than your baby. · Do not do sit-ups or other exercise that strain the belly muscles for 6 weeks or until your doctor says it is okay. · Hold a pillow over your incision when you cough or take deep breaths. This will support your belly and decrease your pain. · You may shower as usual. Pat the incision dry when you are done. · You will have some vaginal bleeding. Wear sanitary pads. Do not douche or use tampons until your doctor says it is okay. · Ask your doctor when you can drive again. · You will probably need to take at least 6 weeks off work. It depends on the type of work you do and how you feel. · Wait until you are healed (about 4 to 6 weeks) before you have sexual intercourse. Your doctor will tell you when it is okay to have sex. · Talk to your doctor about birth control. You can get pregnant even before your period returns. Also, you can get pregnant while you are breast-feeding. Incision care Your skin is your body's first line of defense against germs, but an incision site leaves an easy way for germs to enter your body. To prevent infection: · Clean your hands frequently and before and after changing any touching any dressings. · Look at your incision closely every day for any changes. Contact your doctor if you experience any signs of infection, such as fever or increased redness at the surgical site. · Wash the area daily with warm, soapy water, and pat it dry. Don't use hydrogen peroxide or alcohol, which can slow healing. You may cover the area with a gauze bandage if it weeps or rubs against clothing. Change the bandage every day. · Keep the area clean and dry. Diet · You can eat your normal diet. If your stomach is upset, try bland, low-fat foods like plain rice, broiled chicken, toast, and yogurt. · Drink plenty of fluids (unless your doctor tells you not to). · You may notice that your bowel movements are not regular right after your surgery. This is common. Try to avoid constipation and straining with bowel movements. You may want to take a fiber supplement every day.  If you have not had a bowel movement after a couple of days, ask your doctor about taking a mild laxative. · If you are breast-feeding, do not drink any alcohol. Medicines · Take pain medicines exactly as directed. · If the doctor gave you a prescription medicine for pain, take it as prescribed. · If you are not taking a prescription pain medicine, ask your doctor if you can take an over-the-counter medicine such as acetaminophen (Tylenol), ibuprofen (Advil, Motrin), or naproxen (Aleve), for cramps. Read and follow all instructions on the label. Do not take aspirin, because it can cause more bleeding. Do not take acetaminophen (Tylenol) and other acetaminophen containing medications (i.e. Percocet) at the same time. · If you think your pain medicine is making you sick to your stomach: 
· Take your medicine after meals (unless your doctor has told you not to). · Ask your doctor for a different pain medicine. · If your doctor prescribed antibiotics, take them as directed. Do not stop taking them just because you feel better. You need to take the full course of antibiotics. Mental Health · Many women get the \"baby blues\" during the first few days after childbirth. You may lose sleep, feel irritable, and cry easily. You may feel happy one minute and sad the next. Hormone changes are one cause of these emotional changes. Also, the demands of a new baby, along with visits from relatives or other family needs, add to a mother's stress. The \"baby blues\" often peak around the fourth day. Then they ease up in less than 2 weeks. · If your moodiness or anxiety lasts for more than 2 weeks, or if you feel like life is not worth living, you may have postpartum depression. This is different for each mother. Some mothers with serious depression may worry intensely about their infant's well-being. Others may feel distant from their child. Some mothers might even feel that they might harm their baby. A mother may have signs of paranoia, wondering if someone is watching her. · With all the changes in your life, you may not know if you are depressed. Pregnancy sometimes causes changes in how you feel that are similar to the symptoms of depression. · Symptoms of depression include: · Feeling sad or hopeless and losing interest in daily activities. These are the most common symptoms of depression. · Sleeping too much or not enough. · Feeling tired. You may feel as if you have no energy. · Eating too much or too little. · POSTPARTUM SUPPORT INTERNATIONAL (PSI) offers a Warm line; Chat with the Expert phone sessions; Information and Articles about Pregnancy and Postpartum Mood Disorders; Comprehensive List of Free Support Groups; Knowledgeable local coordinators who will offer support, information, and resources; Guide to Resources on [employer]ocket[employer]; Calendar of events in the  mood disorders community; Latest News and Research; and [facility] [address] for [employer]. Remember - You are not alone; You are not to blame; With help, you will be well. 6-223-516-PPD(3989). WWW. POSTPARTUM. NET · Writing or talking about death, such as writing suicide notes or talking about guns, knives, or pills. Keep the numbers for these national suicide hotlines: 9-051-845-TALK (9-994.302.2130) and 2-366-FQHAYAI (3-467.111.6416). If you or someone you know talks about suicide or feeling hopeless, get help right away. Other instructions · If you breast-feed your baby, you may be more comfortable while you are healing if you place the baby so that he or she is not resting on your belly. Try tucking your baby under your arm, with his or her body along the side you will be feeding on. Support your baby's upper body with your arm. With that hand you can control your baby's head to bring his or her mouth to your breast. This is sometimes called the football hold. Follow-up care is a key part of your treatment and safety. Be sure to make and go to all appointments, and call your doctor if you are having problems. It's also a good idea to know your test results and keep a list of the medicines you take. When should you call for help? Call 911 anytime you think you may need emergency care. For example, call if: 
· You are thinking of hurting yourself, your baby, or anyone else. · You passed out (lost consciousness). · You have symptoms of a blood clot in your lung (called a pulmonary embolism). These may include: 
· Sudden chest pain. · Trouble breathing. · Coughing up blood. Call your doctor now or seek immediate medical care if: 
 
· You have severe vaginal bleeding. · You are soaking through a pad each hour for 2 or more hours. · Your vaginal bleeding seems to be getting heavier or is still bright red 4 days after delivery. · You are dizzy or lightheaded, or you feel like you may faint. · You are vomiting or cannot keep fluids down. · You have a fever. · You have new or more belly pain. · You have loose stitches, or your incision comes open. · You have symptoms of infection, such as: 
· Increased pain, swelling, warmth, or redness. · Red streaks leading from the incision. · Pus draining from the incision. · A fever · You pass tissue (not just blood). · Your vaginal discharge smells bad. · Your belly feels tender or full and hard. · Your breasts are continuously painful or red. · You feel sad, anxious, or hopeless for more than a few days. · You have sudden, severe pain in your belly. · You have symptoms of a blood clot in your leg (called a deep vein thrombosis), such as: 
· Pain in your calf, back of the knee, thigh, or groin. · Redness and swelling in your leg or groin. · You have symptoms of preeclampsia, such as: 
· Sudden swelling of your face, hands, or feet. · New vision problems (such as dimness or blurring). · A severe headache. · Your blood pressure is higher than it should be or rises suddenly. · You have new nausea or vomiting. Watch closely for changes in your health, and be sure to contact your doctor if you have any problems. Additional Information:  Learning About Hypertensive Disorders After Childbirth What is preeclampsia? A woman with preeclampsia has blood pressure that is higher than usual. She may also have other serious symptoms. Preeclampsia can be dangerous. When it is severe, it can cause seizures (eclampsia) or liver or kidney damage. When the liver is affected, some women get HELLP syndrome, a blood-clotting and bleeding problem. HELLP can come on quickly and can be deadly. This is why your doctor checks you and your baby often. Preeclampsia usually occurs after 20 weeks of pregnancy. In rare cases, it is first noted right after childbirth. Most often, it starts near the end of pregnancy and goes away after childbirth. What are the symptoms? Mild preeclampsia usually doesn't cause symptoms. But preeclampsia can cause rapid weight gain and sudden swelling of the hands and face. Severe preeclampsia does cause symptoms. It can cause a very bad headache and trouble seeing and breathing. It also can cause belly pain. You may also urinate less than usual. 
 
If you have new preeclampsia symptoms after you go home from the hospital, call your doctor right away. What can you expect after you have had preeclampsia? In the hospital 
After the baby and the placenta are delivered, preeclampsia usually starts to improve. Most women get better in the first few days after childbirth. After having preeclampsia, you still have a risk of seizures for a day or more after childbirth. (Very rarely, seizures happen later on.) So your doctor may have you take magnesium sulfate for a day or more to prevent seizures. You may also take medicine to lower your blood pressure. When you go home Your blood pressure will most likely return to normal a few days after delivery. Your doctor will want to check your blood pressure sometime in the first week after you leave the hospital. 
 
Some women still have high blood pressure 6 weeks after childbirth. But most return to normal levels over the long term. · Take and record your blood pressure at home if your doctor tells you to. · Learn the importance of the two measures of blood pressure (such as 120 over 80, or 120/80). The first number is the systolic pressure. This is the force of blood on the artery walls as the heart pumps. The second number is the diastolic pressure. This is the force of blood on the artery walls between heartbeats, when the heart is at rest. You have a choice of monitors to use. Manual monitor: You pump up the cuff and use a stethoscope to listen for your  Pulse. · Electronic monitor: The cuff inflates, and a gauge shows your pulse rate. · To take your blood pressure: · Ask your doctor to check your blood pressure monitor to be sure that it is accurate and that the cuff fits you. Also ask your doctor to watch you use it, to make sure that you are using it right. · You should not eat, use tobacco products, or use medicine known to raise blood pressure (such as some nasal decongestant sprays) before you take your blood pressure. · Avoid taking your blood pressure if you have just exercised or are nervous or upset. Rest at least 15 minutes before you take your blood pressure. · Be safe with medicines. If you take medicine, take it exactly as prescribed. Call your doctor if you think you are having a problem with your medicine. · Do not smoke. Quitting smoking will help lower your blood pressure and improve your baby's growth and health. If you need help quitting, talk to your doctor about stop-smoking programs and medicines. These can increase your chances of quitting for good. · Eat a balanced and healthy diet that has lots of fruits and vegetables. Long-term health After you have had preeclampsia, you have a higher-than-average risk of heart disease, stroke, and kidney disease. This may be because the same things that cause preeclampsia also cause heart and kidney disease. To protect your health, work with your doctor on living a heart-healthy lifestyle and getting the checkups you need. Your doctor may also want you to check your blood pressure at home. Follow-up care is a key part of your treatment and safety. Be sure to make and go to all appointments, and call your doctor if you are having problems. It's also a good idea to know your test results and keep a list of the medicines you take. These are general instructions for a healthy lifestyle: No smoking/ No tobacco products/ Avoid exposure to second hand smoke Surgeon General's Warning:  Quitting smoking now greatly reduces serious risk to your health. Obesity, smoking, and sedentary lifestyle greatly increases your risk for illness A healthy diet, regular physical exercise & weight monitoring are important for maintaining a healthy lifestyle Recognize signs and symptoms of STROKE: 
 
F-face looks uneven A-arms unable to move or move unevenly S-speech slurred or non-existent T-time-call 911 as soon as signs and symptoms begin - DO NOT go  
    back to bed or wait to see if you get better - TIME IS BRAIN. I have had the opportunity to make my options or choices for discharge. I have received and understand these instructions. Discharge Orders None Introducing [facility]! Abelardo Yoon introduces [email or web address] patient portal. Now you can access parts of your medical record, email your doctor's office, and request medication refills online. 1. In your internet browser, go to [email or web address] 2. Click on the First Time User? Click Here link in the Sign In box. You will see the New Member Sign Up page. 3. Enter your [employer] Access Code exactly as it appears below. You will not need to use this code after youve completed the sign-up process. If you do not sign up before the expiration date, you must request a new code. · [employer] Access Code: [email or web address] Expires: 8/10/2017 11:33 AM 
 
4. Enter the last four digits of your Social Security Number (xxxx) and Date of Birth (mm/dd/yyyy) as indicated and click Submit. You will be taken to the next sign-up page. 5. Create a [employer] ID. This will be your [employer] login ID and cannot be changed, so think of one that is secure and easy to remember. 6. Create a [employer] password. You can change your password at any time. 7. Enter your Password Reset Question and Answer. This can be used at a later time if you forget your password. 8. Enter your e-mail address. You will receive e-mail notification when new information is available in [address] 9. Click Sign Up. You can now view and download portions of your medical record. 10. Click the Download Summary menu link to download a portable copy of your medical information. If you have questions, please visit the Frequently Asked Questions section of the [employer] website. Remember, [employer] is NOT to be used for urgent needs. For medical emergencies, dial 911. Now available from your iPhone and Android! General Information Please provide this summary of care documentation to your next provider. Patient Signature:  ____________________________________________________________ Date:  ____________________________________________________________  
  
Stephani Dylan Provider Signature:  ____________________________________________________________ Date:  ____________________________________________________________

## 2017-05-14 NOTE — PROGRESS NOTES
5/14/2017  5:13 AM Pt arrived to L&D room 7 with c/o SROM at [address]  [address]38 Spoke to Dr. Geena Rodriguez on phone. Updated him on pt arrival and SROM. Also updated him that patient is scheduled for a c/s tomorrow for PreE and macrosomia (estimated fetal weight [id number] per patient), but that she desires to discuss options for mode of delivery at this time. Orders received to admit pt. Dr. Geena Rodriguez will be out to see patient. 8430 Dr. Geena Rodriguez at bedside, SVE done, discussing plan of care with pt. Plan for ALEXANDER and Pitocin augmentation if needed. 2156 Bedside and Verbal shift change report given to LISSETTE Peterson (oncoming nurse) by Shade Sebastian RN (offgoing nurse). Report included the following information OBSBAR.

## 2017-05-14 NOTE — BRIEF OP NOTE
BRIEF OPERATIVE NOTE    Date of Procedure: 2017   Preoperative Diagnosis: Primary, Elective, Suspected Macrosomia, PreE without severe features @ 37   Postoperative Diagnosis: same as before but delivered    Procedure(s):   SECTION  Surgeon(s) and Role:     * Iesha Overton MD - Primary     * Aroldo Hawkins MD - Assisting         Assistant Staff:       Surgical Staff:  Circ-1: Kyaw Phillips RN  Scrub Tech-1: [employer]  Float Staff: Jose Alfredo Caballero RN  Event Time In   Incision Start 1159   Incision Close      Anesthesia: Spinal   Estimated Blood Loss: 800  Specimens: * No specimens in log *   Findings: VMI, 49#[id number]   Complications: none noted  Implants: * No implants in log *

## 2017-05-14 NOTE — ROUTINE PROCESS
TRANSFER - IN REPORT:    Verbal report received from Edilia Snell RN(name) on Cuong Marie  being received from L & D(unit) for routine progression of care      Report consisted of patients Situation, Background, Assessment and   Recommendations(SBAR). Information from the following report(s) SBAR, Kardex, Procedure Summary, Intake/Output and MAR was reviewed with the receiving nurse. Opportunity for questions and clarification was provided. Assessment completed upon patients arrival to unit and care assumed.        Hourly rounds completed [id number]

## 2017-05-15 LAB
BASOPHILS # BLD AUTO: 0 K/UL (ref 0–0.1)
BASOPHILS # BLD: 0 % (ref 0–1)
EOSINOPHIL # BLD: 0 K/UL (ref 0–0.4)
EOSINOPHIL NFR BLD: 0 % (ref 0–7)
ERYTHROCYTE [DISTWIDTH] IN BLOOD BY AUTOMATED COUNT: 15.3 % (ref 11.5–14.5)
HCT VFR BLD AUTO: 29 % (ref 35–47)
HGB BLD-MCNC: 9.1 G/DL (ref 11.5–16)
LYMPHOCYTES # BLD AUTO: 14 % (ref 12–49)
LYMPHOCYTES # BLD: 1.2 K/UL (ref 0.8–3.5)
MCH RBC QN AUTO: 23.9 PG (ref 26–34)
MCHC RBC AUTO-ENTMCNC: 31.4 G/DL (ref 30–36.5)
MCV RBC AUTO: 76.1 FL (ref 80–99)
MONOCYTES # BLD: 0.6 K/UL (ref 0–1)
MONOCYTES NFR BLD AUTO: 8 % (ref 5–13)
NEUTS SEG # BLD: 6.5 K/UL (ref 1.8–8)
NEUTS SEG NFR BLD AUTO: 78 % (ref 32–75)
PLATELET # BLD AUTO: 259 K/UL (ref 150–400)
RBC # BLD AUTO: 3.81 M/UL (ref 3.8–5.2)
WBC # BLD AUTO: 8.4 K/UL (ref 3.6–11)

## 2017-05-15 PROCEDURE — 74011250636 HC RX REV CODE- 250/636: Performed by: OBSTETRICS & GYNECOLOGY

## 2017-05-15 PROCEDURE — 74011250637 HC RX REV CODE- 250/637: Performed by: OBSTETRICS & GYNECOLOGY

## 2017-05-15 PROCEDURE — 36415 COLL VENOUS BLD VENIPUNCTURE: CPT | Performed by: OBSTETRICS & GYNECOLOGY

## 2017-05-15 PROCEDURE — 85025 COMPLETE CBC W/AUTO DIFF WBC: CPT | Performed by: OBSTETRICS & GYNECOLOGY

## 2017-05-15 PROCEDURE — 65410000002 HC RM PRIVATE OB

## 2017-05-15 PROCEDURE — 74011250636 HC RX REV CODE- 250/636: Performed by: ANESTHESIOLOGY

## 2017-05-15 RX ADMIN — IBUPROFEN 800 MG: 400 TABLET, FILM COATED ORAL at 11:56

## 2017-05-15 RX ADMIN — KETOROLAC TROMETHAMINE 30 MG: 30 INJECTION, SOLUTION INTRAMUSCULAR at 06:03

## 2017-05-15 RX ADMIN — OXYCODONE HYDROCHLORIDE AND ACETAMINOPHEN 1 TABLET: 5; 325 TABLET ORAL at 17:57

## 2017-05-15 RX ADMIN — SODIUM CHLORIDE, POTASSIUM CHLORIDE, SODIUM LACTATE AND CALCIUM CHLORIDE 1000 ML: 600; 310; 30; 20 INJECTION, SOLUTION INTRAVENOUS at 02:50

## 2017-05-15 RX ADMIN — DOCUSATE SODIUM 100 MG: 100 CAPSULE, LIQUID FILLED ORAL at 20:18

## 2017-05-15 RX ADMIN — IBUPROFEN 800 MG: 400 TABLET, FILM COATED ORAL at 20:09

## 2017-05-15 NOTE — PROGRESS NOTES
0800 received report from Evelyn Craig rn using sbar format  1000 up and walked to bathroom without dizziness or lightheadedness   Voided large amount urine without difficulty  This was patients second time getting out of bed and walking to bathroom and voiding  Check void completed     Hourly rounds completed [id number]  Hourly rounds completed [id number]

## 2017-05-15 NOTE — LACTATION NOTE
Pt delivered by  yesterday at 40 weeks and 2 days  Baby was admitted to the NICU. Pt has been pumping every 3 hours. She states she is just getting some drops of colostrum on the flange of the pump. She is not getting enough to take into the NICU. Pt has syringes and labels at the bedside. I showed mom hand expression but we were not able to express any colostrum. Mom will continue to pump every 2-3 hours.

## 2017-05-15 NOTE — ROUTINE PROCESS
2000:  Bedside and Verbal shift change report given to Joe Parry RN  (oncoming nurse) by Viviana Graves. Shivam GRANT (offgoing nurse). Report included the following information SBAR.   2105:   Patient vomited 1000 cc of light yellow liquid emesis. Had just eaten chicken broth and ginger ale prior to vomiting. Zofran given for nausea. 0030:  Urine output 20cc/hr over past four hours. MD paged for notification. 0145:  Spoke with Dr. Savannah Oropeza about urine output. Orders received for liter bolus and to keep lilly in place until output 30 cc/hr or greater.

## 2017-05-15 NOTE — PROGRESS NOTES
Anesthesia Post Op Duramorph Note. Patient is s/p spinal or epidural and DuraMorph for Cesearean Section. Pain, itching, and nausea and/or vomiting respectively are:  mild, none, mild. All symptoms were treated with protocol meds with good results. Patient is up and ambulating without complaints. Site looks ok. Plan: Continue protocols as needed.

## 2017-05-15 NOTE — ROUTINE PROCESS
Verbal/bedside report received from MARQUITA Harper RN using OB SBAR.    5488-2017 hourly rounds complete.

## 2017-05-15 NOTE — ADT AUTH CERT NOTES
H&P by Chris Rodriguez MD at 17 1530 documented on Admission (Discharged) from 2017 in [facility] [address] ANTEPARTUM/MI        Author: Chris Rodriguez MD Author Type: Physician Filed: [id number]     Note Status: Signed Cosign: Cosign Not Required Date of Service: 17 1530     : Chris Rodriguez MD (Physician)                    EDC:2017  EGA: 36 weeks, 5 days     High level admission     HPI:    at 36w5d with GHTN, LGA, presents to L&D to r/o Pre-E per Dr. Ismael Berumen     /90, repeat 124/96. Intermittent HA over the past few days - relieved temporarily with Tylenol. C/o some visual changes, Denies RUQ pain. To [facility] to r/o Pre-E.  EFW >99% (9-10, 4372g), LIZETTE 13.9cm, anterior placenta, vtx, BPP 8/8. Dr. Ismael Berumen discussed risks and benefits of ALEXANDER vs 1LTCS. Strongly considering 1LTCS.    GBS cx obtained today     Vital Signs   32Years Old Female  Weight: 314.5 pounds  BMI: 46.61  BP: 132/90     Pap/HPV/Gardasil History   History of abnormal pap: no  Gardasil Injection History: Pt Advised/Considering Gardasil     Patient's Prenatal Care with Doctor of Record Mayelin Adames MD Notable For -     Decreased fetal movement  Anemia on FE pregnant  LGA ____ >99%ile at 24wks &28wks  Obesity in pregnancy BMI>34.99-FS ____, BMI 44, FS ___, growth q4wk ___, wkly surveillance 36wk __, consider delivery by 40wk   lab screening  Normal pregnancy primigravida  Family History Breast Cancer     Allergies     This patient has no known allergies.     Medications Removed from Medication List     Flowsheet View for Follow-up Visit  Estimated weeks of  gestation:  36 5/7  Weight:  314.5  Blood pressure: 132 / 90  Urine Protein:  N  Urine Glucose: N  Headache:  few  Nausea/vomiting: No  Edema:  feet  Vaginal bleeding: no  Vaginal discharge: d/c  Fetal activity:  yes  Fundal height:  45  FHR:   141  Labor symptoms: few ctx  Fetal position:  vertex  Cx Dilation:  FT  Cx Effacement: 70%  Cx Station: -2  Next visit:  1 wk  Preceptor:  michael  Comment:   Repeat /96. Intermittent HA over the past few days - relieved w/ tylenol then returned, some visual changes started today, no RUQ pain. To [facility] (w/ GBS in hand) to r/o PEC. EFW >99% (9-10, 4372g), LIZETTE 13.9cm, anterior placenta, vtx, BPP 8/8. Discussed R/B of ALEXANDER vs 1LTCS. Strongly considering 1LTCS.          Impression & Recommendations:           Problem # 1: Transient HTN in pregnancy (ICD-642.33) (REL57-T82.3)     BP elevated x2 in office. New onset HA and visual changes. Recommend L&D evaluation to r/o Pre-E/HELLP. Serial BP, labs, NST. Pt planning [facility] delivery so prefers to go there for evaluation.      Problem #2:   LGA s/p U/s today with EFW 99% ;; 9lbs 10oz. Nl glucola     Fetal Weight Calculation:  EFW 4,372 g  >99% Hadlock  EFW (lb,oz) 9 lb  10  oz  Calculated by Hadlock (BPD-HC-AC-FL)     Discussed R/B of ALEXANDER vs 1LTCS. Pt strongly considering 1LTCS.      Problem # 3:   GBS cx pending     Medications (at conclusion of this visit)     10/17/2016 PRENATAL FORMULA CAPS (PRENATAL VIT-FE FUM-FA-OMEGA CAPS)      Primary Provider: Leroy Westbrook MD        History of Present Illness:  33 y/o  @ 36.5wks presents for routine PN visit and growth scan. New onset HA and visual changes. No RUQ pain. No ctx, LOF, VB. Good fetal movement. Pregnancy complicated by obesity (BMI 46) and LGA (EFW >99% 9-10 today).          Past Medical History:  Reviewed history from 2016 and no changes required:  Anxiety     Past Surgical History:  Reviewed history from 2016 and no changes required:  wisdom teeth     Family History Summary:   Reviewed history Last on 2017 and no changes required:2017        General Comments - FH:  Family history transferred to [address] [place] [address] And [address] [place] [address]           Social History:  Reviewed history from 10/17/2016 and no changes required:    Works at [employer]  Smoking History:  Patient is a former smoker.           Review of Systems   See HPI     Except as noted in the HPI, the review of systems is negative for General, Breast, , Resp, GI, Endo, MS, Psych and Heme.        Vital Signs     Blood Pressure: 132 / 90     Weight:  314.5 pounds     BMI:   46.61 inches     Physical Exam      General   General appearance: no acute distress     Head   Inspection: normal     Eyes   External: EOM intact     ENT   Dental: adequate dentition     Chest   Lungs: clear to auscultation  Heart: regular rate and rhythm     Extremeties   Extremeties: tr edema bilaterally     Neurological   Reflexes: 2+ and symmetric with no pathological reflexes     Psych   Orientation: oriented to time, place, and person  Mood: no appearance of anxiety, depression, or agitation     Lymph   Inguinal: no inguinal adenopathy     Skin   Inspection: no rashes, suspicious lesions, or ulcerations     Abdomen   Abdomen: gravid  Fundal Height: 45  EFW: 9-10     Pelvic Exam   EGBUS: no lesions  Vagina: normal appearing without lesions or discharge  Uterus: gravid  Cervix: no lesions or discharge  Dilation: : FT  Effacement: 70%  Station: -2  Presentation: vertex  Membranes: intact  Position: mid     Allergies     This patient has no known allergies.     Medications Removed from Medication List           Impression & Recommendations:     Problem # 1: Normal pregnancy primigravida (ICD-V22.1) (QHY42-Y81.98)  GBS collected today. Pt to take to [facility] L&D.      Orders:  Antepartum Care (CPT-10)        Problem # 2: Transient HTN in pregnancy (ICD-642.33) (WBB99-W05.3)  BP elevated x2 in office. New onset HA and visual changes. Recommend L&D evaluation to r/o PEC/HELLP. Serial BP, labs, NST. Pt planning [facility] delivery so prefers to go there for evaluation.      Orders:  Patient Sent to L&D (CPT-LD)  Sent to L&D (CPT-AdmitF)        Problem # 3: LGA ____ >99%ile at 24wks &28wks (ICD-656.63) (QMB45-H10.62x0)  EFW >99% (9-10) today.    Discussed R/B of ALEXANDER vs 1LTCS. Pt strongly considering 1LTCS.      Problem # 4: Obesity in pregnancy BMI>34.99-FS ____, BMI 44, FS ___, growth q4wk ___, wkly surveillance 36wk __, consider delivery by 40wk (VRM-901.86) (ARY36-B00.213)     Orders:  Weekly PNV w/ NST + BPP No MFM (NST & BPP @ each visit) (xxxx)           Medications (at conclusion of this visit)     10/17/2016 PRENATAL FORMULA CAPS (PRENATAL VIT-FE FUM-FA-OMEGA CAPS)                      LABORATORY DATA   TEST DATE RESULT   Group B Strep culture   (Group B Strep Culture Result Field)   Blood Type 10/17/2016 O (Blood Type Result Field)   Rh 10/17/2016 Positive (Rh Result Field)   Rhogam Inj Given       Tdap Vaccine Given 03/14/2017 Vacc. [address]   Antibody Screen 03/14/2017 Negative   Rubella  [facility] Reference Ranges On or After 3/10/14   <0.90 Non-immune  0.90 - 0.99 Equivocal  >0.99 Immune   [facility] Reference Ranges  Before 3/10/14   <5 Non-immune   5 - 9 Equivocal   >9 Immune  [facility] Reference Ranges   < Or = 0.90 Negative   0.91-1.09 Equivocal   > Or = 1.10 Positive  10/17/2016    2.05   TPA (T Pallidum Antibodies) 03/14/2017 Negative   Serology (RPR)       HBsAg 10/17/2016 Negative   HIV 10/17/2016 Non Reactive   Hemoglobin 03/14/2017 11.0   Hematocrit 03/14/2017 32.6   Platelets 83/41/2284 276 X10E3/UL   TSH       Urine Culture 10/17/2016 Negative   GC DNA Probe 10/17/2016 Negative   Chlamydia DNA 10/17/2016 Negative   PAP 10/17/2016 NIL   Flu Vaccine Given 10/17/2016 Vacc. VWC   HGBA1C       HGB Electro       T4, Free       BG Fasting       GTT 1H 50G 03/14/2017 119   GTT 1H 100G       GTT 2H 100G       GTT 3H 100G       Glucose Plasma       CF Accept or Decline 10/17/2016 Declined   CF Screen Result 10/17/2016 Declined   Nuchal Trans 02/14/2017 6.92^6. 92 mm&millimeters   AFP Only        Tetra 01/19/2017 *Screen Negative*    AFP Serum        CVS 10/17/2016 declined   AFP Amniotic       Amnio Karyo       FISH       GC Culture       Chlamydia Cult       Ureaplasma     Mycoplasma       WBC 10/17/2016 9.6 X10E3/UL   RBC 10/17/2016 5.07 X10E6/UL   MCV 10/17/2016 79   MCH 10/17/2016 25.4   MCHC RBC 10/17/2016 32.1           ULTRASOUND DATA   TEST DATE RESULT   Estimated Fetal Weight 04/11/2017 7735.55870335^2328 g&grams    Weight % 04/11/2017 100^>99% %&percent    LIZETTE 04/11/2017 74.36^95.4 cm&centimeters    BPP 04/11/2017 8^8 [n/a]&Not applicable   Cervical Length (mm)          ]        Electronically signed by Wilmer James MD on 05/11/2017 at 10:06 AM     ________________________________________________________________________

## 2017-05-15 NOTE — PROGRESS NOTES
Post-Partum Day Number 1 Progress Note    [facility]     Assessment: Doing well, post partum day 1-patient in the NICU    Plan:  1. Continue routine postpartum and perineal care as well as maternal education. 2. N/A -male in the nicu    Information for the patient's :  Jenise Nuñez [[id number]]   , Low Transverse   Patient doing well without significant complaint. Voiding without difficulty, normal lochia. Vitals:  Visit Vitals    /74 (BP 1 Location: Right arm, BP Patient Position: At rest)    Pulse (!) 102    Temp 98.6 °F (37 °C)    Resp 18    Ht 5' 9\" (1.753 m)    Wt 142.4 kg (314 lb)    SpO2 97%    Breastfeeding No    BMI 46.37 kg/m2     Temp (24hrs), Av.1 °F (36.7 °C), Min:97.4 °F (36.3 °C), Max:98.6 °F (37 °C)        Exam:   Patient without distress. Abdomen soft, fundus firm, nontender                Perineum with normal lochia noted. Lower extremities are negative for swelling, cords or tenderness.     Labs:     Lab Results   Component Value Date/Time    WBC 8.4 05/15/2017 05:58 AM    WBC 10.0 2017 07:00 AM    WBC 8.2 2017 06:23 AM    WBC 9.0 2017 11:30 AM    HGB 9.1 05/15/2017 05:58 AM    HGB 11.2 2017 07:00 AM    HGB 10.9 2017 06:23 AM    HGB 11.3 2017 11:30 AM    HCT 29.0 05/15/2017 05:58 AM    HCT 35.6 2017 07:00 AM    HCT 34.0 2017 06:23 AM    HCT 35.7 2017 11:30 AM    PLATELET 155  05:58 AM    PLATELET 920  07:00 AM    PLATELET 946  06:23 AM    PLATELET 292  11:30 AM       Recent Results (from the past 24 hour(s))   CBC WITH AUTOMATED DIFF    Collection Time: 05/15/17  5:58 AM   Result Value Ref Range    WBC 8.4 3.6 - 11.0 K/uL    RBC 3.81 3.80 - 5.20 M/uL    HGB 9.1 (L) 11.5 - 16.0 g/dL    HCT 29.0 (L) 35.0 - 47.0 %    MCV 76.1 (L) 80.0 - 99.0 FL    MCH 23.9 (L) 26.0 - 34.0 PG    MCHC 31.4 30.0 - 36.5 g/dL    RDW 15.3 (H) 11.5 - 14.5 %    PLATELET 486 607 - 489 K/uL    NEUTROPHILS 78 (H) 32 - 75 %    LYMPHOCYTES 14 12 - 49 %    MONOCYTES 8 5 - 13 %    EOSINOPHILS 0 0 - 7 %    BASOPHILS 0 0 - 1 %    ABS. NEUTROPHILS 6.5 1.8 - 8.0 K/UL    ABS. LYMPHOCYTES 1.2 0.8 - 3.5 K/UL    ABS. MONOCYTES 0.6 0.0 - 1.0 K/UL    ABS. EOSINOPHILS 0.0 0.0 - 0.4 K/UL    ABS.  BASOPHILS 0.0 0.0 - 0.1 K/UL

## 2017-05-16 PROBLEM — O42.90 PROM (PREMATURE RUPTURE OF MEMBRANES): Status: RESOLVED | Noted: 2017-05-14 | Resolved: 2017-05-16

## 2017-05-16 PROBLEM — D64.9 ANEMIA: Status: ACTIVE | Noted: 2017-05-16

## 2017-05-16 PROCEDURE — 74011250637 HC RX REV CODE- 250/637: Performed by: OBSTETRICS & GYNECOLOGY

## 2017-05-16 PROCEDURE — 65410000002 HC RM PRIVATE OB

## 2017-05-16 RX ADMIN — OXYCODONE HYDROCHLORIDE AND ACETAMINOPHEN 2 TABLET: 5; 325 TABLET ORAL at 20:20

## 2017-05-16 RX ADMIN — IBUPROFEN 800 MG: 400 TABLET, FILM COATED ORAL at 22:16

## 2017-05-16 RX ADMIN — IBUPROFEN 800 MG: 400 TABLET, FILM COATED ORAL at 04:05

## 2017-05-16 RX ADMIN — OXYCODONE HYDROCHLORIDE AND ACETAMINOPHEN 2 TABLET: 5; 325 TABLET ORAL at 15:49

## 2017-05-16 RX ADMIN — OXYCODONE HYDROCHLORIDE AND ACETAMINOPHEN 2 TABLET: 5; 325 TABLET ORAL at 09:45

## 2017-05-16 RX ADMIN — OXYCODONE HYDROCHLORIDE AND ACETAMINOPHEN 1 TABLET: 5; 325 TABLET ORAL at 02:56

## 2017-05-16 RX ADMIN — IBUPROFEN 800 MG: 400 TABLET, FILM COATED ORAL at 13:34

## 2017-05-16 NOTE — PROGRESS NOTES
Bedside and Verbal shift change report given to Aubrey Moralez (oncoming nurse) by SAGE Rouse RN (offgoing nurse). Report included the following information SBAR     5264-0515: hourly rounds complete  .

## 2017-05-16 NOTE — ROUTINE PROCESS
0800 Received OB SBAR report at bedside from JOCELYN Perez to make aware of EPDS score of 15 and that the patient had selected \"Hardly Ever\" for \"The thought of harming myself has occurred to me. \"  1200 Hourly rounds completed on patient from 0800 to 1200.  1600 Hourly rounds completed on patient from 1200 to 1600.

## 2017-05-16 NOTE — ROUTINE PROCESS
Bedside and Verbal shift change report given to JOCELYN Brady (oncoming nurse) by Gissell Cannon RN (offgoing nurse). Report included the following information SBAR.     9898-6774: Hourly rounds completed. 4055-2745: Hourly rounds completed. 8158-5328: Hourly rounds completed.

## 2017-05-16 NOTE — LACTATION NOTE
Mother has spent most of the day visiting baby in NICU. She was able to attempt breastfeeding a few times and has pump set up in her room. We discussed pumping regimen and need to stimulate breasts for milk transition. Mother advised that hospital grade pump may be recommended for use when baby can not breastfeed. Mother states that baby may be able to join her in her room this evening.

## 2017-05-16 NOTE — PROGRESS NOTES
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 Baby overall doing well, will room in [facility] and d/c home with pt most likely. Will need surgery in few month      Post-Op day 2, overall doing well. Tearful at times with EDPS score of 15, declines meds for now , but will follow closely pp    Plan:   1. Routine post-operative care  2. Hopefully dc tomorrow    Information for the patient's :  Peng Zamora [[id number]]   , Low Transverse   Patient doing well without significant complaint. Nausea and vomiting resolved, tolerating liquids, passing flatus, voiding and ambulating without difficulty.     Current Facility-Administered Medications   Medication Dose Route Frequency    lactated ringers infusion  125 mL/hr IntraVENous CONTINUOUS    lactated ringers infusion  125 mL/hr IntraVENous CONTINUOUS    sodium chloride (NS) flush 5-10 mL  5-10 mL IntraVENous Q8H    ibuprofen (MOTRIN) tablet 800 mg  800 mg Oral Q8H    measles, mumps & rubella Vacc (PF) (M-M-R II) injection 0.5 mL  0.5 mL SubCUTAneous PRIOR TO DISCHARGE    diph,Pertuss(AC),Tet Vac-PF (BOOSTRIX) suspension 0.5 mL  0.5 mL IntraMUSCular PRIOR TO DISCHARGE       Vitals:  Visit Vitals    /70 (BP 1 Location: Right arm, BP Patient Position: Sitting)    Pulse 80    Temp 97.9 °F (36.6 °C)    Resp 16    Ht 5' 9\" (1.753 m)    Wt 142.4 kg (314 lb)    SpO2 97%    Breastfeeding No    BMI 46.37 kg/m2     Temp (24hrs), Av.9 °F (36.6 °C), Min:97.8 °F (36.6 °C), Max:98 °F (36.7 °C)        Exam:        Patient without distress. Abdomen, bowel sounds present, soft, expected tenderness, fundus not palpable                Wound vac in place, dry and intact               Lower extremities 2+ edema, no  cords or tenderness. Labs:   Lab Results   Component Value Date/Time    WBC 8.4 05/15/2017 05:58 AM    WBC 10.0 05/14/2017 07:00 AM    WBC 8.2 05/12/2017 06:23 AM    HGB 9.1 05/15/2017 05:58 AM    HGB 11.2 05/14/2017 07:00 AM    HGB 10.9 05/12/2017 06:23 AM    HCT 29.0 05/15/2017 05:58 AM    HCT 35.6 05/14/2017 07:00 AM    HCT 34.0 05/12/2017 06:23 AM    PLATELET 827 47/68/3554 05:58 AM    PLATELET 106 69/05/5354 07:00 AM    PLATELET 090 08/56/7597 06:23 AM       No results found for this or any previous visit (from the past 24 hour(s)).

## 2017-05-16 NOTE — LACTATION NOTE
This note was copied from a baby's chart. Assisted with breastfeeding. Baby was slightly alert and rooting. Baby held cross cradle with only a few audible suck/swallow. Baby generally aggressive during this feeding session. Will continue to follow.

## 2017-05-17 VITALS
WEIGHT: 293 LBS | TEMPERATURE: 98.7 F | HEIGHT: 69 IN | SYSTOLIC BLOOD PRESSURE: 141 MMHG | OXYGEN SATURATION: 97 % | BODY MASS INDEX: 43.4 KG/M2 | HEART RATE: 96 BPM | RESPIRATION RATE: 16 BRPM | DIASTOLIC BLOOD PRESSURE: 80 MMHG

## 2017-05-17 PROCEDURE — 74011250637 HC RX REV CODE- 250/637: Performed by: OBSTETRICS & GYNECOLOGY

## 2017-05-17 RX ORDER — OXYCODONE AND ACETAMINOPHEN 5; 325 MG/1; MG/1
2 TABLET ORAL
Qty: 30 TAB | Refills: 0 | Status: SHIPPED | OUTPATIENT
Start: 2017-05-17 | End: 2021-01-21

## 2017-05-17 RX ORDER — IBUPROFEN 800 MG/1
800 TABLET ORAL
Qty: 30 TAB | Refills: 0 | Status: SHIPPED | OUTPATIENT
Start: 2017-05-17 | End: 2022-06-23 | Stop reason: ALTCHOICE

## 2017-05-17 RX ADMIN — OXYCODONE HYDROCHLORIDE AND ACETAMINOPHEN 2 TABLET: 5; 325 TABLET ORAL at 12:50

## 2017-05-17 RX ADMIN — OXYCODONE HYDROCHLORIDE AND ACETAMINOPHEN 2 TABLET: 5; 325 TABLET ORAL at 06:49

## 2017-05-17 RX ADMIN — OXYCODONE HYDROCHLORIDE AND ACETAMINOPHEN 2 TABLET: 5; 325 TABLET ORAL at 00:58

## 2017-05-17 RX ADMIN — IBUPROFEN 800 MG: 400 TABLET, FILM COATED ORAL at 06:49

## 2017-05-17 NOTE — PROGRESS NOTES
Post-Operative  Day 3    Boni Degroot       Assessment: Post-Op day 3, doing well    Plan:   1. Discharge home today  2. Follow up in office in 6 weeks with Stephan Patel MD  3. Post partum activity/wound care advised, diet as tolerated  4. Discharge Medications: pain medication per discharge summary and medications prior to admission  5. Mood - will follow up in 1 week     - dressing removal in 1 week    - BP check in one week (all normal since delivery)      Information for the patient's :  Joel Oh [[id number]]   , Low Transverse   Patient doing well without significant complaint. Tolerating diet, passing flatus, voiding and ambulating without difficulty    Vitals:  Visit Vitals    /67 (BP 1 Location: Left arm, BP Patient Position: At rest;Supine)    Pulse 95    Temp 97.8 °F (36.6 °C)    Resp 16    Ht 5' 9\" (1.753 m)    Wt 142.4 kg (314 lb)    SpO2 97%    Breastfeeding No    BMI 46.37 kg/m2     Temp (24hrs), Av.9 °F (36.6 °C), Min:97.8 °F (36.6 °C), Max:98.1 °F (36.7 °C)        Exam:        Patient without distress. Abdomen, soft, expected tenderness, fundus firm                Wound incision clean, dry and intact               Lower extremities are symmetric without tenderness, cords or erythema.     Labs:   Lab Results   Component Value Date/Time    WBC 8.4 05/15/2017 05:58 AM    WBC 10.0 2017 07:00 AM    WBC 8.2 2017 06:23 AM    WBC 9.0 2017 11:30 AM    HGB 9.1 05/15/2017 05:58 AM    HGB 11.2 2017 07:00 AM    HGB 10.9 2017 06:23 AM    HGB 11.3 2017 11:30 AM    HCT 29.0 05/15/2017 05:58 AM    HCT 35.6 2017 07:00 AM    HCT 34.0 2017 06:23 AM    HCT 35.7 2017 11:30 AM    PLATELET 702  05:58 AM    PLATELET 847  07:00 AM    PLATELET 047  06:23 AM    PLATELET 378  11:30 AM       No results found for this or any previous visit (from the past 24 hour(s)).

## 2017-05-17 NOTE — WOUND CARE
WOCN Note:     New consult placed for wound vac  Chart reviewed prior to assessement. Orders to removed wound vac. Chart shows:  Admitted for Routine  hx of obesity    Assessment:   Abdominal low transverse incision:  Wound VAC at 125 mmHg with black foam.    Treatment:  Removed Wound Vac and 1 piece of black foam.  Incision well approximated with sharmila. Cara GRANT to clarify with MD about order to remove staples and apply dressing. Will follow as needed.     HARINI Valdovinos RN  Office [phone]  Pager [phone]

## 2017-05-17 NOTE — ROUTINE PROCESS
0730: OB SBAR report received by Shelby Lopez RN. 1215: Staples removed and aquacel with tegaderm applied per MD order. Pt tolerated well and incision site well approximated. Aquacel clean, dry, and intact. 1250: Discharge instructions reviewed with pt. All questions answered. Prescriptions given. Follow up in 1 week with Dr. Juanito Lopez. Hourly rounds completed [id number].

## 2017-05-17 NOTE — DISCHARGE INSTRUCTIONS
POSTPARTUM DISCHARGE INSTRUCTIONS       Name:  Toribio Lang  YOB: 1990  Admission Diagnosis:  PROM (premature rupture of membranes)  Preeclampsia  Maternal obesity affecting pregnancy, antepartum  Macrosomia  Primary, Elective, Suspected Macrosomia     Discharge Diagnosis:    Problem List as of 2017  Date Reviewed: 2017          Codes Class Noted - Resolved    Single delivery by  ICD-10-CM: O82  ICD-9-CM: 669.71  2017 - Present        Anemia ICD-10-CM: D64.9  ICD-9-CM: 285.9  2017 - Present        Preeclampsia ICD-10-CM: O14.90  ICD-9-CM: 642.40  2017 - Present        Maternal obesity affecting pregnancy, antepartum ICD-10-CM: O99.210  ICD-9-CM: 649.13  2017 - Present        RESOLVED: Macrosomia ICD-10-CM: P08.0  ICD-9-CM: 766.0  2017 - 2017        RESOLVED: PROM (premature rupture of membranes) ICD-10-CM: O42.90  ICD-9-CM: 658.10  2017 - 2017            Attending Physician:  Jah Solorio MD    Delivery Type:   Section: What to Expect at Home    Your Recovery:  A  section, or , is surgery to deliver your baby through a cut, called an incision that the doctor makes in your lower belly and uterus. You may have some pain in your lower belly and need pain medicine for 1 to 2 weeks. You can expect some vaginal bleeding for several weeks. You will probably need about 6 weeks to fully recover. It is important to take it easy while the incision is healing. Avoid heavy lifting, strenuous activities, or exercises that strain the belly muscles while you are recovering. Ask a family member or friend for help with housework, cooking, and shopping. This care sheet gives you a general idea about how long it will take for you to recover. But each person recovers at a different pace. Follow the steps below to get better as quickly as possible. How can you care for yourself at home?     Activity  · Rest when you feel tired. Getting enough sleep will help you recover. · Try to walk each day. Start by walking a little more than you did the day before. Bit by bit, increase the amount you walk. Walking boosts blood flow and helps prevent pneumonia, constipation, and blood clots. · Avoid strenuous activities, such as bicycle riding, jogging, weightlifting, and aerobic exercise, for 6 weeks or until your doctor says it is okay. · Until your doctor says it is okay, do not lift anything heavier than your baby. · Do not do sit-ups or other exercise that strain the belly muscles for 6 weeks or until your doctor says it is okay. · Hold a pillow over your incision when you cough or take deep breaths. This will support your belly and decrease your pain. · You may shower as usual. Pat the incision dry when you are done. · You will have some vaginal bleeding. Wear sanitary pads. Do not douche or use tampons until your doctor says it is okay. · Ask your doctor when you can drive again. · You will probably need to take at least 6 weeks off work. It depends on the type of work you do and how you feel. · Wait until you are healed (about 4 to 6 weeks) before you have sexual intercourse. Your doctor will tell you when it is okay to have sex. · Talk to your doctor about birth control. You can get pregnant even before your period returns. Also, you can get pregnant while you are breast-feeding. Incision care  Your skin is your body's first line of defense against germs, but an incision site leaves an easy way for germs to enter your body. To prevent infection:  · Clean your hands frequently and before and after changing any touching any dressings. · Look at your incision closely every day for any changes. Contact your doctor if you experience any signs of infection, such as fever or increased redness at the surgical site. · Wash the area daily with warm, soapy water, and pat it dry.  Don't use hydrogen peroxide or alcohol, which can slow healing. You may cover the area with a gauze bandage if it weeps or rubs against clothing. Change the bandage every day. · Keep the area clean and dry. Diet  · You can eat your normal diet. If your stomach is upset, try bland, low-fat foods like plain rice, broiled chicken, toast, and yogurt. · Drink plenty of fluids (unless your doctor tells you not to). · You may notice that your bowel movements are not regular right after your surgery. This is common. Try to avoid constipation and straining with bowel movements. You may want to take a fiber supplement every day. If you have not had a bowel movement after a couple of days, ask your doctor about taking a mild laxative. · If you are breast-feeding, do not drink any alcohol. Medicines  · Take pain medicines exactly as directed. · If the doctor gave you a prescription medicine for pain, take it as prescribed. · If you are not taking a prescription pain medicine, ask your doctor if you can take an over-the-counter medicine such as acetaminophen (Tylenol), ibuprofen (Advil, Motrin), or naproxen (Aleve), for cramps. Read and follow all instructions on the label. Do not take aspirin, because it can cause more bleeding. Do not take acetaminophen (Tylenol) and other acetaminophen containing medications (i.e. Percocet) at the same time. · If you think your pain medicine is making you sick to your stomach:  · Take your medicine after meals (unless your doctor has told you not to). · Ask your doctor for a different pain medicine. · If your doctor prescribed antibiotics, take them as directed. Do not stop taking them just because you feel better. You need to take the full course of antibiotics. Mental Health  · Many women get the \"baby blues\" during the first few days after childbirth. You may lose sleep, feel irritable, and cry easily. You may feel happy one minute and sad the next. Hormone changes are one cause of these emotional changes.  Also, the demands of a new baby, along with visits from relatives or other family needs, add to a mother's stress. The \"baby blues\" often peak around the fourth day. Then they ease up in less than 2 weeks. · If your moodiness or anxiety lasts for more than 2 weeks, or if you feel like life is not worth living, you may have postpartum depression. This is different for each mother. Some mothers with serious depression may worry intensely about their infant's well-being. Others may feel distant from their child. Some mothers might even feel that they might harm their baby. A mother may have signs of paranoia, wondering if someone is watching her. · With all the changes in your life, you may not know if you are depressed. Pregnancy sometimes causes changes in how you feel that are similar to the symptoms of depression. · Symptoms of depression include:  · Feeling sad or hopeless and losing interest in daily activities. These are the most common symptoms of depression. · Sleeping too much or not enough. · Feeling tired. You may feel as if you have no energy. · Eating too much or too little. · [employer] ([employer]I) offers a Warm line; Chat with the Expert phone sessions; Information and Articles about Pregnancy and Postpartum Mood Disorders; Comprehensive List of Free Support Groups; Knowledgeable local coordinators who will offer support, information, and resources; Guide to Resources on [employer]; Calendar of events in the  mood disorders community; Latest News and Research; and [facility] [address] for [employer]. Remember - You are not alone; You are not to blame; With help, you will be well. 2-998-186-PPD(5988). WWW. POSTPARTUM. NET   · Writing or talking about death, such as writing suicide notes or talking about guns, knives, or pills. Keep the numbers for these national suicide hotlines: 2-787-835-TALK (8-942.672.3564) and 7-064-CZFEPIY (0-540.742.4691).  If you or someone you know talks about suicide or feeling hopeless, get help right away. Other instructions  · If you breast-feed your baby, you may be more comfortable while you are healing if you place the baby so that he or she is not resting on your belly. Try tucking your baby under your arm, with his or her body along the side you will be feeding on. Support your baby's upper body with your arm. With that hand you can control your baby's head to bring his or her mouth to your breast. This is sometimes called the football hold. Follow-up care is a key part of your treatment and safety. Be sure to make and go to all appointments, and call your doctor if you are having problems. It's also a good idea to know your test results and keep a list of the medicines you take. When should you call for help? Call 911 anytime you think you may need emergency care. For example, call if:  · You are thinking of hurting yourself, your baby, or anyone else. · You passed out (lost consciousness). · You have symptoms of a blood clot in your lung (called a pulmonary embolism). These may include:  · Sudden chest pain. · Trouble breathing. · Coughing up blood. Call your doctor now or seek immediate medical care if:    · You have severe vaginal bleeding. · You are soaking through a pad each hour for 2 or more hours. · Your vaginal bleeding seems to be getting heavier or is still bright red 4 days after delivery. · You are dizzy or lightheaded, or you feel like you may faint. · You are vomiting or cannot keep fluids down. · You have a fever. · You have new or more belly pain. · You have loose stitches, or your incision comes open. · You have symptoms of infection, such as:  · Increased pain, swelling, warmth, or redness. · Red streaks leading from the incision. · Pus draining from the incision. · A fever  · You pass tissue (not just blood). · Your vaginal discharge smells bad.   · Your belly feels tender or full and hard.  · Your breasts are continuously painful or red. · You feel sad, anxious, or hopeless for more than a few days. · You have sudden, severe pain in your belly. · You have symptoms of a blood clot in your leg (called a deep vein thrombosis), such as:  · Pain in your calf, back of the knee, thigh, or groin. · Redness and swelling in your leg or groin. · You have symptoms of preeclampsia, such as:  · Sudden swelling of your face, hands, or feet. · New vision problems (such as dimness or blurring). · A severe headache. · Your blood pressure is higher than it should be or rises suddenly. · You have new nausea or vomiting. Watch closely for changes in your health, and be sure to contact your doctor if you have any problems. Additional Information:  Learning About Hypertensive Disorders After Childbirth    What is preeclampsia? A woman with preeclampsia has blood pressure that is higher than usual. She may also have other serious symptoms. Preeclampsia can be dangerous. When it is severe, it can cause seizures (eclampsia) or liver or kidney damage. When the liver is affected, some women get HELLP syndrome, a blood-clotting and bleeding problem. HELLP can come on quickly and can be deadly. This is why your doctor checks you and your baby often. Preeclampsia usually occurs after 20 weeks of pregnancy. In rare cases, it is first noted right after childbirth. Most often, it starts near the end of pregnancy and goes away after childbirth. What are the symptoms? Mild preeclampsia usually doesn't cause symptoms. But preeclampsia can cause rapid weight gain and sudden swelling of the hands and face. Severe preeclampsia does cause symptoms. It can cause a very bad headache and trouble seeing and breathing. It also can cause belly pain. You may also urinate less than usual.    If you have new preeclampsia symptoms after you go home from the hospital, call your doctor right away.     What can you expect after you have had preeclampsia? In the hospital  After the baby and the placenta are delivered, preeclampsia usually starts to improve. Most women get better in the first few days after childbirth. After having preeclampsia, you still have a risk of seizures for a day or more after childbirth. (Very rarely, seizures happen later on.) So your doctor may have you take magnesium sulfate for a day or more to prevent seizures. You may also take medicine to lower your blood pressure. When you go home  Your blood pressure will most likely return to normal a few days after delivery. Your doctor will want to check your blood pressure sometime in the first week after you leave the hospital.    Some women still have high blood pressure 6 weeks after childbirth. But most return to normal levels over the long term. · Take and record your blood pressure at home if your doctor tells you to. · Learn the importance of the two measures of blood pressure (such as 120 over 80, or 120/80). The first number is the systolic pressure. This is the force of blood on the artery walls as the heart pumps. The second number is the diastolic pressure. This is the force of blood on the artery walls between heartbeats, when the heart is at rest. You have a choice of monitors to use. Manual monitor: You pump up the cuff and use a stethoscope to listen for your  Pulse. · Electronic monitor: The cuff inflates, and a gauge shows your pulse rate. · To take your blood pressure:  · Ask your doctor to check your blood pressure monitor to be sure that it is accurate and that the cuff fits you. Also ask your doctor to watch you use it, to make sure that you are using it right. · You should not eat, use tobacco products, or use medicine known to raise blood pressure (such as some nasal decongestant sprays) before you take your blood pressure. · Avoid taking your blood pressure if you have just exercised or are nervous or upset. Rest at least 15 minutes before you take your blood pressure. · Be safe with medicines. If you take medicine, take it exactly as prescribed. Call your doctor if you think you are having a problem with your medicine. · Do not smoke. Quitting smoking will help lower your blood pressure and improve your baby's growth and health. If you need help quitting, talk to your doctor about stop-smoking programs and medicines. These can increase your chances of quitting for good. · Eat a balanced and healthy diet that has lots of fruits and vegetables. Long-term health   After you have had preeclampsia, you have a higher-than-average risk of heart disease, stroke, and kidney disease. This may be because the same things that cause preeclampsia also cause heart and kidney disease. To protect your health, work with your doctor on living a heart-healthy lifestyle and getting the checkups you need. Your doctor may also want you to check your blood pressure at home. Follow-up care is a key part of your treatment and safety. Be sure to make and go to all appointments, and call your doctor if you are having problems. It's also a good idea to know your test results and keep a list of the medicines you take. These are general instructions for a healthy lifestyle:    No smoking/ No tobacco products/ Avoid exposure to second hand smoke    Surgeon General's Warning:  Quitting smoking now greatly reduces serious risk to your health. Obesity, smoking, and sedentary lifestyle greatly increases your risk for illness    A healthy diet, regular physical exercise & weight monitoring are important for maintaining a healthy lifestyle    Recognize signs and symptoms of STROKE:    F-face looks uneven    A-arms unable to move or move unevenly    S-speech slurred or non-existent    T-time-call 911 as soon as signs and symptoms begin - DO NOT go       back to bed or wait to see if you get better - TIME IS BRAIN.       I have had the opportunity to make my options or choices for discharge. I have received and understand these instructions.

## 2017-05-17 NOTE — ROUTINE PROCESS
Bedside and Verbal shift change report given to DMITRIY Gonzalez RN (oncoming nurse) by QUAN Dubon RN (offgoing nurse). Report included the following information SBAR, Kardex, MAR and Accordion. Hourly rounds performed at 0000 to 0400 hrs. Hourly rounds performed at 0400 to 0800 hrs.

## 2021-01-21 ENCOUNTER — OFFICE VISIT (OUTPATIENT)
Dept: INTERNAL MEDICINE CLINIC | Age: 31
End: 2021-01-21
Payer: MEDICAID

## 2021-01-21 DIAGNOSIS — E66.9 OBESITY WITHOUT SERIOUS COMORBIDITY, UNSPECIFIED CLASSIFICATION, UNSPECIFIED OBESITY TYPE: ICD-10-CM

## 2021-01-21 DIAGNOSIS — Z00.00 WELLNESS EXAMINATION: Primary | ICD-10-CM

## 2021-01-21 DIAGNOSIS — F41.9 ANXIETY: ICD-10-CM

## 2021-01-21 LAB
A-G RATIO,AGRAT: 1.5 RATIO
ALBUMIN SERPL-MCNC: 4.7 G/DL (ref 3.9–5.4)
ALP SERPL-CCNC: 102 U/L (ref 38–126)
ALT SERPL-CCNC: 33 U/L (ref 0–35)
ANION GAP SERPL CALC-SCNC: 13 MMOL/L
AST SERPL W P-5'-P-CCNC: 24 U/L (ref 14–36)
BILIRUB SERPL-MCNC: 0.6 MG/DL (ref 0.2–1.3)
BILIRUB UR QL: NEGATIVE
BUN SERPL-MCNC: 14 MG/DL (ref 7–17)
BUN/CREATININE RATIO,BUCR: 20 RATIO
CALCIUM SERPL-MCNC: 9.4 MG/DL (ref 8.4–10.2)
CHLORIDE SERPL-SCNC: 100 MMOL/L (ref 98–107)
CHOL/HDL RATIO,CHHD: 4 RATIO (ref 0–4)
CHOLEST SERPL-MCNC: 202 MG/DL (ref 0–200)
CLARITY: CLEAR
CO2 SERPL-SCNC: 30 MMOL/L (ref 22–32)
COLOR UR: ABNORMAL
CREAT SERPL-MCNC: 0.7 MG/DL (ref 0.7–1.2)
ERYTHROCYTE [DISTWIDTH] IN BLOOD BY AUTOMATED COUNT: 14.9 %
GLOBULIN,GLOB: 3.2
GLUCOSE 24H UR-MRATE: NEGATIVE G/(24.H)
GLUCOSE SERPL-MCNC: 97 MG/DL (ref 65–105)
HCT VFR BLD AUTO: 43.9 % (ref 37–51)
HDLC SERPL-MCNC: 45 MG/DL (ref 35–130)
HGB BLD-MCNC: 14 G/DL (ref 12–18)
HGB UR QL STRIP: NEGATIVE
KETONES UR QL STRIP.AUTO: NEGATIVE
LDL/HDL RATIO,LDHD: 3 RATIO
LDLC SERPL CALC-MCNC: 123 MG/DL (ref 0–130)
LEUKOCYTE ESTERASE: NEGATIVE
LYMPHOCYTES ABSOLUTE: 1.8 K/UL (ref 0.6–4.1)
LYMPHOCYTES NFR BLD: 23.1 % (ref 10–58.5)
MCH RBC QN AUTO: 24.3 PG (ref 26–32)
MCHC RBC AUTO-ENTMCNC: 31.9 G/DL (ref 30–36)
MCV RBC AUTO: 76 FL (ref 80–97)
MONOCYTES ABS-DIF,2141: 0.4 K/UL (ref 0–1.8)
MONOCYTES NFR BLD: 5.2 % (ref 0.1–24)
NEUTROPHILS # BLD: 71.7 % (ref 37–92)
NEUTROPHILS ABS,2156: 5.7 K/UL (ref 2–7.8)
NITRITE UR QL STRIP.AUTO: NEGATIVE
PH UR STRIP: 6 [PH] (ref 5–7)
PLATELET # BLD AUTO: 341 K/UL (ref 140–440)
POTASSIUM SERPL-SCNC: 4.4 MMOL/L (ref 3.6–5)
PROT SERPL-MCNC: 7.9 G/DL (ref 6.3–8.2)
PROT UR STRIP-MCNC: ABNORMAL MG/DL
RBC # BLD AUTO: 5.77 M/UL (ref 4.2–6.3)
RBC #/AREA URNS HPF: 0 #/HPF
SODIUM SERPL-SCNC: 143 MMOL/L (ref 137–145)
SP GR UR REFRACTOMETRY: 1.02 (ref 1–1.03)
SQUAMOUS EPITHELIAL CELLS: ABNORMAL
TRIGL SERPL-MCNC: 169 MG/DL (ref 0–200)
TSH SERPL DL<=0.05 MIU/L-ACNC: 1.53 UIU/ML (ref 0.34–5.6)
UROBILINOGEN UR QL STRIP.AUTO: NEGATIVE
VLDLC SERPL CALC-MCNC: 34 MG/DL
WBC # BLD AUTO: 7.9 K/UL (ref 4.1–10.9)
WBC URNS QL MICRO: 0 #/HPF

## 2021-01-21 PROCEDURE — 81001 URINALYSIS AUTO W/SCOPE: CPT | Performed by: NURSE PRACTITIONER

## 2021-01-21 PROCEDURE — 80053 COMPREHEN METABOLIC PANEL: CPT | Performed by: NURSE PRACTITIONER

## 2021-01-21 PROCEDURE — 84443 ASSAY THYROID STIM HORMONE: CPT | Performed by: NURSE PRACTITIONER

## 2021-01-21 PROCEDURE — 99204 OFFICE O/P NEW MOD 45 MIN: CPT | Performed by: NURSE PRACTITIONER

## 2021-01-21 PROCEDURE — 80061 LIPID PANEL: CPT | Performed by: NURSE PRACTITIONER

## 2021-01-21 PROCEDURE — 85025 COMPLETE CBC W/AUTO DIFF WBC: CPT | Performed by: NURSE PRACTITIONER

## 2021-01-21 RX ORDER — BUPROPION HYDROCHLORIDE 300 MG/1
TABLET ORAL
COMMUNITY
Start: 2020-10-19

## 2021-01-21 RX ORDER — ACETAMINOPHEN 325 MG/1
325 TABLET ORAL DAILY PRN
COMMUNITY
End: 2022-11-03

## 2021-01-21 RX ORDER — LORATADINE 10 MG/1
1 TABLET ORAL DAILY PRN
COMMUNITY
End: 2022-11-03

## 2021-01-21 NOTE — PROGRESS NOTES
No chief complaint on file. HPI:     David Cloud is a 27y.o. year old female who is here to establish as a new patient, and for comprehensive personal healthcare exam.    The patient states that she is feeling fairly healthy overall, and has no major complaints today. She has been treated for postpartum depression and anxiety with Wellbutrin  mg daily which she believes is working fairly well. This is currently being prescribed by her gynecologist.    Patient states it has been quite sometime, likely 10 to 12 months, since her last general blood work has been done. She would like to do this today. In addition, she complains of a \"small black jay\" at the cuticle line of her left fifth toenail. She states it has been noticeable for the past 2 to 3 weeks, but denies any pain, history of trauma, redness, swelling, or discharge. 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 There were no vitals taken for this visit. Past Medical History:   Diagnosis Date    Anemia     Asthma     as a child--no inhaler    Essential hypertension     elevated blood pressures in office    Psychiatric problem     anxiety       Past Surgical History:   Procedure Laterality Date    HX OTHER SURGICAL      wisdom teeth       Prior to Admission medications    Medication Sig Start Date End Date Taking? Authorizing Provider   ibuprofen (MOTRIN) 800 mg tablet Take 1 Tab by mouth every eight (8) hours as needed for Pain. 5/17/17   Marcos Jaimes MD   oxyCODONE-acetaminophen (PERCOCET) 5-325 mg per tablet Take 2 Tabs by mouth every four (4) hours as needed. Max Daily Amount: 12 Tabs. 5/17/17   Marcos Jaimes MD   PNV No12-Iron-FA-DSS-OM-3 29 mg iron-1 mg -50 mg CPKD Take  by mouth.     Provider, Historical        No Known Allergies     Social History     Socioeconomic History    Marital status:      Spouse name: Not on file    Number of children: Not on file    Years of education: Not on file    Highest education level: Not on file   Occupational History    Not on file   Social Needs    Financial resource strain: Not on file    Food insecurity     Worry: Not on file     Inability: Not on file    Transportation needs     Medical: Not on file     Non-medical: Not on file   Tobacco Use    Smoking status: Former Smoker     Years: 2.00    Smokeless tobacco: Never Used   Substance and Sexual Activity    Alcohol use: No    Drug use: No    Sexual activity: Never   Lifestyle    Physical activity     Days per week: Not on file     Minutes per session: Not on file    Stress: Not on file   Relationships    Social connections     Talks on phone: Not on file     Gets together: Not on file     Attends Restoration service: Not on file     Active member of club or organization: Not on file     Attends meetings of clubs or organizations: Not on file     Relationship status: Not on file    Intimate partner violence     Fear of current or ex partner: Not on file     Emotionally abused: Not on file     Physically abused: Not on file     Forced sexual activity: Not on file   Other Topics Concern    Not on file   Social History Narrative    Not on file        ROS:     Constitutional: She denies fevers, weight loss, night sweats. Eyes: No blurred/double vision or photophobia. ENT: No difficulty with swallowing, taste, speech or smell. Respiratory: No cough, wheezing, or shortness of breath. Cardiovascular: Denies chest pain, palpitations, unexplained indigestion or syncope episodes. Gastrointestinal:  No changes in bowel movements, no abdominal pain, no bloating. Genitourinary:  She denies increased frequency, urgency, dysuria or nocturia. Extremities: No acute or chronic joint pain, stiffness or swelling. Neurological:  No numbness, tingling, burning paresthesias or loss of motor strength. No syncope episodes, dizziness or new headaches. Skin: See HPI left fifth toenail.   No recent rashes, bruising, or mole changes. Psychiatric/Behavioral:  Negative for depression or anxiety. Hematologic: No easy bruising or bleeding gums  Lymphatic: No lymph node enlargement or night sweats  Endocrine: No increased urination, increased hunger, or increased thirst. No rapid weight change and no night sweats, hot/cold intolerance. Physical Examination:   There were no vitals filed for this visit. General appearance - Alert, well appearing, and in no distress  Mental status - Alert, oriented to person, place, and time  HEENT:  Ears - Bilateral TM's and external ear canals clear, cone of light present and normal. No perforations. Eyes - Pupillary responses normal.  Extraocular muscle function intact. Lids and conjunctiva not injected or icteric. Pharynx- Moist, pink without lesions. Teeth intact and in good repair without signs of caries. Neck - Supple without thyromegaly or bruit. No JVD noted. Lungs - Clear to auscultation and percussion. No crackles, wheezes, or abnormal breath sounds present. Cardiac- Normal rate, regular rhythm without murmur. PMI not displaced. No rub or gallop noted. Abdomen -round, soft, non-tender without palpable organomegaly or mass. No pulsatile mass was felt, and not bruit was heard. Bowel sounds were active   Female -deferred. Rectal -deferred. Extremities -  No clubbing cyanosis or edema  Lymphatics - No palpable lymphadenopathy, no hepatosplenomegaly  Skin -small linear dark jay approximately 1 mm in width and base of left fifth toenail at cuticle line. No rashes, bruises, or unusual mole change noted  Neurological - Cranial nerves II-XII grossly intact. Motor strength 5/5. DTR's 2+ and symmetric. Station and gait normal  Psychiatric -patient speech has normal rate and rhythm and responds to questions appropriately. The patient's affect is appropriate and mood is appropriate. Memory is intact to immediate, recent and remote.   Patient's thought content and thought processing appear to be within normal limits. Assessment/Plan:       ICD-10-CM ICD-9-CM    1. Wellness examination  Z00.00 V70.0 CBC WITH AUTOMATED DIFF      LIPID PANEL      METABOLIC PANEL, COMPREHENSIVE      TSH 3RD GENERATION      URINALYSIS W/MICROSCOPIC   2. Post partum depression  O99.345 648.44     F53.0 311    3. Anxiety  F41.9 300.00    4. Obesity without serious comorbidity, unspecified classification, unspecified obesity type  E66.9 278.00      1: Baseline labs ordered today including: CBC, CMP, lipid panel, TSH, and urinalysis. 2: Patient to continue bupropion  mg daily for management of postpartum depression. Follow-up with gynecology accordingly. 3: Patient to work on lifestyle management and weight loss. Watch diet. 4: Patient to monitor discoloration of toenail. Return to clinic if worsening. 5: Follow-up with me in approximately 6 months or sooner as needed. Patient states understanding and agrees with plan. I have reviewed the patient's medical history in detail and updated the computerized patient record. We had a prolonged discussion about these complex clinical issues and went over the various important aspects to consider. All questions were answered. Advised her to call back or return to office if symptoms do not improve, change in nature, or persist.    She was given an after visit summary or informed of [employer] Access which includes patient instructions, diagnoses, current medications, & vitals. She expressed understanding with the diagnosis and plan. Signed,   Svitlana Cruz.  Alfonso Lynn, MSN APRN [facility]

## 2021-04-08 ENCOUNTER — TELEPHONE (OUTPATIENT)
Dept: INTERNAL MEDICINE CLINIC | Age: 31
End: 2021-04-08

## 2021-04-08 DIAGNOSIS — M62.830 MUSCLE SPASM OF BACK: Primary | ICD-10-CM

## 2021-04-08 RX ORDER — CYCLOBENZAPRINE HCL 5 MG
5 TABLET ORAL
Qty: 30 TAB | Refills: 0 | Status: SHIPPED | OUTPATIENT
Start: 2021-04-08 | End: 2022-06-23 | Stop reason: ALTCHOICE

## 2021-04-08 NOTE — TELEPHONE ENCOUNTER
Pt is having back pain. States it started today 04/08/21, when she bent over to  her child. The pain started in between her shoulders, but when she is stood it radiated to her rib area. States that she has no shortness of breath but it does make it harder to breath when the pain is there. States the pain in rib area is a 7 when standing and a 9 overall when bending. Pt states that when she tried sitting it was liked she was just hesitant on how to sit and there wasn't that much pain as getting up. Would like to know if she could be prescribed a muscle relaxer to help with the pain. Please advise. Thank you.

## 2021-04-24 ENCOUNTER — IMMUNIZATION (OUTPATIENT)
Dept: FAMILY MEDICINE CLINIC | Age: 31
End: 2021-04-24
Payer: MEDICAID

## 2021-04-24 DIAGNOSIS — Z23 ENCOUNTER FOR IMMUNIZATION: Primary | ICD-10-CM

## 2021-04-24 PROCEDURE — 91300 COVID-19, MRNA, LNP-S, PF, 30MCG/0.3ML DOSE(PFIZER): CPT | Performed by: FAMILY MEDICINE

## 2021-04-24 PROCEDURE — 0001A COVID-19, MRNA, LNP-S, PF, 30MCG/0.3ML DOSE(PFIZER): CPT | Performed by: FAMILY MEDICINE

## 2021-05-15 ENCOUNTER — IMMUNIZATION (OUTPATIENT)
Dept: FAMILY MEDICINE CLINIC | Age: 31
End: 2021-05-15
Payer: MEDICAID

## 2021-05-15 DIAGNOSIS — Z23 ENCOUNTER FOR IMMUNIZATION: Primary | ICD-10-CM

## 2021-05-15 PROCEDURE — 0002A COVID-19, MRNA, LNP-S, PF, 30MCG/0.3ML DOSE(PFIZER): CPT | Performed by: FAMILY MEDICINE

## 2021-05-15 PROCEDURE — 91300 COVID-19, MRNA, LNP-S, PF, 30MCG/0.3ML DOSE(PFIZER): CPT | Performed by: FAMILY MEDICINE

## 2021-10-30 ENCOUNTER — HOSPITAL ENCOUNTER (EMERGENCY)
Age: 31
Discharge: HOME | End: 2021-10-30
Attending: EMERGENCY MEDICINE | Admitting: EMERGENCY MEDICINE
Payer: MEDICAID

## 2021-10-30 VITALS
BODY MASS INDEX: 46.37 KG/M2 | RESPIRATION RATE: 14 BRPM | DIASTOLIC BLOOD PRESSURE: 90 MMHG | HEART RATE: 94 BPM | WEIGHT: 293 LBS | SYSTOLIC BLOOD PRESSURE: 150 MMHG | TEMPERATURE: 98.1 F | OXYGEN SATURATION: 98 %

## 2021-10-30 DIAGNOSIS — T43.291A BUPROPION OVERDOSE, ACCIDENTAL OR UNINTENTIONAL, INITIAL ENCOUNTER: Primary | ICD-10-CM

## 2021-10-30 PROCEDURE — 93005 ELECTROCARDIOGRAM TRACING: CPT

## 2021-10-30 PROCEDURE — 99282 EMERGENCY DEPT VISIT SF MDM: CPT

## 2021-10-30 NOTE — DISCHARGE INSTRUCTIONS
It was a pleasure taking care of you in our Emergency Department today. We know that when you come to [facility], you are entrusting us with your health, comfort, and safety. Our physicians and nurses honor that trust, and truly appreciate the opportunity to care for you and your loved ones. We also value your feedback. If you receive a survey about your Emergency Department experience today, please fill it out. We care about our patients' feedback, and we listen to what you have to say. Please read over your discharge instructions as these contain pertinent information to help you in the healing process. These instructions include a list of prescriptions you were given today. Follow-up information is also noted on your discharge papers. There are attached instructions and information pertaining to the reason why you were seen in the emergency department today. These discharge instructions may not be for exactly why you were here, but may be the closest available instructions that we have. These include important advice for things that you can do at home to feel better, and reasons to return to the emergency department. The evaluation and treatment you received in the emergency department is not always definitive care. If follow-up with your primary care doctor or specialist was recommended, it is important that you make these appointments for follow-up care. You may need further testing, procedures, and/or medications to help you feel better. Further tests may be required that are not available in the emergency department. Failure to make these follow-up appointments may jeopardize your health. The emergency department is here for emergent stabilization and evaluation of life and limb threatening illness and/or injuries.   Further care through a specialist or primary care doctor may be required to assist in your healing and complete your treatment and/or evaluation. We may not always be able to make a diagnosis in the emergency department, or things may change that will alter your diagnosis. Our primary goal is to ensure that nothing serious is occurring and that you are stable to continue your treatment and evaluation at home as an outpatient. Of course, if things change, and you feel worse, you are always encouraged to return to the emergency department for re-evaluation. Lab Results Today:  Recent Results (from the past 8 hour(s))   EKG, 12 LEAD, INITIAL    Collection Time: 10/30/21  1:32 PM   Result Value Ref Range    Ventricular Rate 91 BPM    Atrial Rate 91 BPM    P-R Interval 166 ms    QRS Duration 98 ms    Q-T Interval 360 ms    QTC Calculation (Bezet) 442 ms    Calculated P Axis 32 degrees    Calculated R Axis -33 degrees    Calculated T Axis 28 degrees    Diagnosis       ** Poor data quality, interpretation may be adversely affected  Normal sinus rhythm  Left axis deviation  Voltage criteria for left ventricular hypertrophy  No previous ECGs available     EKG, 12 LEAD, SUBSEQUENT    Collection Time: 10/30/21  4:26 PM   Result Value Ref Range    Ventricular Rate 75 BPM    Atrial Rate 75 BPM    P-R Interval 166 ms    QRS Duration 100 ms    Q-T Interval 398 ms    QTC Calculation (Bezet) 444 ms    Calculated P Axis 38 degrees    Calculated R Axis -26 degrees    Calculated T Axis 17 degrees    Diagnosis       Normal sinus rhythm  Voltage criteria for left ventricular hypertrophy  Possible Lateral infarct , age undetermined  When compared with ECG of 30-OCT-2021 13:32,  MANUAL COMPARISON REQUIRED, DATA IS UNCONFIRMED          Radiology Results Today:  No results found.

## 2021-10-30 NOTE — ED NOTES
I have reviewed discharge instructions with the patient. The patient verbalized understanding.   Alert and stable for discharge

## 2021-10-30 NOTE — ED NOTES
Poison control called and updated on patients care. They report she needs to be watched for 6 hours from ingestion which was around 10am so 4pm would be 6 hours.

## 2021-10-30 NOTE — ED PROVIDER NOTES
EMERGENCY DEPARTMENT HISTORY AND PHYSICAL EXAM      Date: 10/30/2021  Patient Name: Roma Woodard    History of Presenting Illness     Chief Complaint   Patient presents with    Drug Overdose     pt may have accidentally taken an extra dose of 300mg wellbutrin. Pt denies feeling any different since taking it and Denies it was done for self harm       History Provided By: Patient    HPI: Roma Woodard, 32 y.o. female  presents to the ED with cc of accidentally taking an extra dose of her Wellbutrin  mg. Patient did this around 10 AM.  She called poison control who told her to come to the ER. She states she is asymptomatic. Poison control advised that the complication to watch for is going to be QTC prolongation. No other coingestions. Again this was accidental and not intentional.    Past History     Past Medical History:  Past Medical History:   Diagnosis Date    Anemia     Asthma     as a child--no inhaler    Essential hypertension     elevated blood pressures in office    Psychiatric problem     anxiety       Past Surgical History:  Past Surgical History:   Procedure Laterality Date    HX OTHER SURGICAL      wisdom teeth       Medications:  No current facility-administered medications on file prior to encounter. Current Outpatient Medications on File Prior to Encounter   Medication Sig Dispense Refill    cyclobenzaprine (FLEXERIL) 5 mg tablet Take 1 Tab by mouth three (3) times daily as needed for Muscle Spasm(s). 30 Tab 0    acetaminophen (TYLENOL) 325 mg tablet Take 325 mg by mouth daily as needed.  buPROPion XL (WELLBUTRIN XL) 300 mg XL tablet TAKE 1 TABLET BY MOUTH EVERY DAY      loratadine (CLARITIN) 10 mg tablet Take 1 Tab by mouth daily as needed.  ibuprofen (MOTRIN) 800 mg tablet Take 1 Tab by mouth every eight (8) hours as needed for Pain. 30 Tab 0    PNV No12-Iron-FA-DSS-OM-3 29 mg iron-1 mg -50 mg CPKD Take  by mouth.          Family History:  No family history on file.    Social History:  Social History     Tobacco Use    Smoking status: Former Smoker     Years: 2.00    Smokeless tobacco: Never Used   Substance Use Topics    Alcohol use: No    Drug use: No       Allergies:  No Known Allergies    All the above components of the past  history are auto-populated from the electronic record. They have been reviewed and the patient has been interviewed for any pertinent past history that pertains to the patient's chief complaint and reason for visit. Not all pre-populated components may be accurate at the time this note was generated. Review of Systems   Review of Systems   Constitutional: Negative for chills and fever. HENT: Negative for congestion, ear pain, rhinorrhea, sore throat and trouble swallowing. Eyes: Negative for visual disturbance. Respiratory: Negative for cough, chest tightness and shortness of breath. Cardiovascular: Negative for chest pain and palpitations. Gastrointestinal: Negative for abdominal pain, blood in stool, constipation, diarrhea, nausea and vomiting. Genitourinary: Negative for decreased urine volume, difficulty urinating, dysuria and frequency. Musculoskeletal: Negative for back pain and neck pain. Skin: Negative for color change and rash. Neurological: Negative for dizziness, weakness, light-headedness and headaches. Physical Exam   Physical Exam  Vitals and nursing note reviewed. Constitutional:       General: She is not in acute distress. Appearance: She is well-developed. She is not ill-appearing. HENT:      Head: Normocephalic. Eyes:      Conjunctiva/sclera: Conjunctivae normal.   Cardiovascular:      Rate and Rhythm: Normal rate and regular rhythm. Pulmonary:      Effort: Pulmonary effort is normal. No accessory muscle usage or respiratory distress. Abdominal:      General: There is no distension. Musculoskeletal:      Cervical back: Normal range of motion.    Skin:     General: Skin is warm and dry. Neurological:      Mental Status: She is alert and oriented to person, place, and time. Due to the COVID-19 pandemic, in order to reduce the spread and transmission of the virus, some basic elements of the physical exam have been deferred to reduce direct or close contact with the patient unless they are deemed to be absolutely necessary, regardless of whether the virus is highly suspected or not. Diagnostic Study Results     Labs -     Recent Results (from the past 24 hour(s))   EKG, 12 LEAD, INITIAL    Collection Time: 10/30/21  1:32 PM   Result Value Ref Range    Ventricular Rate 91 BPM    Atrial Rate 91 BPM    P-R Interval 166 ms    QRS Duration 98 ms    Q-T Interval 360 ms    QTC Calculation (Bezet) 442 ms    Calculated P Axis 32 degrees    Calculated R Axis -33 degrees    Calculated T Axis 28 degrees    Diagnosis       ** Poor data quality, interpretation may be adversely affected  Normal sinus rhythm  Left axis deviation  Voltage criteria for left ventricular hypertrophy  No previous ECGs available     EKG, 12 LEAD, SUBSEQUENT    Collection Time: 10/30/21  4:26 PM   Result Value Ref Range    Ventricular Rate 75 BPM    Atrial Rate 75 BPM    P-R Interval 166 ms    QRS Duration 100 ms    Q-T Interval 398 ms    QTC Calculation (Bezet) 444 ms    Calculated P Axis 38 degrees    Calculated R Axis -26 degrees    Calculated T Axis 17 degrees    Diagnosis       Normal sinus rhythm  Voltage criteria for left ventricular hypertrophy  Possible Lateral infarct , age undetermined  When compared with ECG of 30-OCT-2021 13:32,  MANUAL COMPARISON REQUIRED, DATA IS UNCONFIRMED         Radiologic Studies -   No orders to display     CT Results  (Last 48 hours)    None        CXR Results  (Last 48 hours)    None            Medical Decision Making     I reviewed the vital signs, available nursing notes, past medical history, past surgical history, family history and social history.     Vital Signs-I have reviewed the vital signs that have been made available during the patient's emergency department visit. The vital signs auto-populated below are obtained mostly by electronic means through monitoring devices that have been downloaded into the patient's chart by the nursing staff. Some vital signs are not downloaded into the chart until after the patient has been discharged and this note has been completed, therefore some vital signs may not be available to the physician for review prior to patient's discharge or admission. The physician has reviewed the patient's triage vital signs, monitored the electronic monitoring devices remotely for any significant vital sign abnormalities, and have reviewed vital signs prior to discharge. Some vital signs reviewed at bedside or remotely utilizing electronic monitoring devices may be different than the vital signs downloaded into the electronic medical record. Some vital signs may be erroneous and inaccurate since they are obtained by electronic monitoring devices, and not all vital signs are verified for accuracy by nursing staff prior to downloading into the patient's chart. Patient Vitals for the past 24 hrs:   Temp Pulse Resp BP SpO2   10/30/21 1323 98.1 °F (36.7 °C) 94 14 (!) 150/90 98 %         Records Reviewed: Nursing notes for today's visit have been reviewed. I have also reviewed most recent medical records pertinent to today's complaints, if available in our medical record system. I have also reviewed all labs and imaging results from previous results in comparison to results obtained today. If an EKG was obtained today, it has been compared to previous EKGs, if available. If arriving via EMS, the EMS report has been reviewed if made available to us within the patient's time in the emergency department. Provider Notes (Medical Decision Making):   Patient presents with an accidental ingestion of an extra dose of her Wellbutrin this morning.   Poison control was contacted by the patient prior to coming to the ER. They recommended monitoring for 6 hours from ingestion. Recommend monitoring for QTC prolongation. No other labs or anything were recommended unless there was evidence of QT prolongation. Patient had 2 serial EKGs obtained while here in the ER. No QTC prolongation was present. She remained asymptomatic.'s discharge 6 hours after the initial ingestion. ED Course:   Initial assessment performed. The patients presenting problems have been discussed, and they are in agreement with the care plan formulated and outlined with them. I have encouraged them to ask questions as they arise throughout their visit. Orders Placed This Encounter    EKG NOTEWRITER(ASAP ONLY)    EKG 12 LEAD INITIAL    EKG, 12 LEAD, REPEAT       EKG    Date/Time: 10/30/2021 1:32 PM  Performed by: Anna Culver MD  Authorized by: Anna Culver MD     ECG reviewed by ED Physician in the absence of a cardiologist: yes    Rate:     ECG rate:  91    ECG rate assessment: normal    Rhythm:     Rhythm: sinus rhythm    Ectopy:     Ectopy: none    QRS:     QRS axis:  Left  Conduction:     Conduction: normal    ST segments:     ST segments:  Normal  T waves:     T waves: normal            Critical Care Time:   0    Disposition:  Discharge    The patient's emergency department evaluation is now complete. I have reviewed all labs, imaging, and pertinent information. I have discussed all results with the patient and/or family. Based on our evaluation today I do believe that the patient is safe to be discharged home. The patient has been provided with at home instructions that are pertinent to their complaint today, although these may not be specific to the exact diagnosis. I have reviewed the patient's home medications and attempted to reconcile if not already done so by pharmacy or nursing staff. I have discussed all new prescriptions with the patient.   The patient has been encouraged to follow-up with primary care doctor and/or specialist, and these have been discussed with the patient. The patient has been advised that they may return to the emergency department if they have any worsening symptoms and or new symptoms that are of concern to them. Verbal discharge instructions may have also been provided to the patient that may not be specifically contained in the written discharge instructions. The patient has been given opportunity to ask questions prior to discharge. PLAN:  1. Discharge Medication List as of 10/30/2021  4:33 PM        2. Follow-up Information     Follow up With Specialties Details Why Contact Info    Manoj López NP Internal Medicine Schedule an appointment as soon as possible for a visit  As needed Justin  [phone]          Return to ED if worse     Diagnosis     Clinical Impression:   1.  Bupropion overdose, accidental or unintentional, initial encounter

## 2021-10-31 LAB
ATRIAL RATE: 75 BPM
ATRIAL RATE: 91 BPM
CALCULATED P AXIS, ECG09: 32 DEGREES
CALCULATED P AXIS, ECG09: 38 DEGREES
CALCULATED R AXIS, ECG10: -26 DEGREES
CALCULATED R AXIS, ECG10: -33 DEGREES
CALCULATED T AXIS, ECG11: 17 DEGREES
CALCULATED T AXIS, ECG11: 28 DEGREES
DIAGNOSIS, 93000: NORMAL
DIAGNOSIS, 93000: NORMAL
P-R INTERVAL, ECG05: 166 MS
P-R INTERVAL, ECG05: 166 MS
Q-T INTERVAL, ECG07: 360 MS
Q-T INTERVAL, ECG07: 398 MS
QRS DURATION, ECG06: 100 MS
QRS DURATION, ECG06: 98 MS
QTC CALCULATION (BEZET), ECG08: 442 MS
QTC CALCULATION (BEZET), ECG08: 444 MS
VENTRICULAR RATE, ECG03: 75 BPM
VENTRICULAR RATE, ECG03: 91 BPM

## 2022-03-19 PROBLEM — O14.90 PREECLAMPSIA: Status: ACTIVE | Noted: 2017-05-14

## 2022-03-19 PROBLEM — O99.210 MATERNAL OBESITY AFFECTING PREGNANCY, ANTEPARTUM: Status: ACTIVE | Noted: 2017-05-14

## 2022-03-20 PROBLEM — D64.9 ANEMIA: Status: ACTIVE | Noted: 2017-05-16

## 2022-03-23 ENCOUNTER — TELEPHONE (OUTPATIENT)
Dept: INTERNAL MEDICINE CLINIC | Age: 32
End: 2022-03-23

## 2022-03-23 DIAGNOSIS — R06.2 WHEEZING: ICD-10-CM

## 2022-03-23 DIAGNOSIS — J06.9 UPPER RESPIRATORY TRACT INFECTION, UNSPECIFIED TYPE: Primary | ICD-10-CM

## 2022-03-23 NOTE — TELEPHONE ENCOUNTER
Patient states she went to [facility] when she was diagnosed with Covid. Was given Prednisone and that worked. Patient called in today and states she feels like her symptoms are coming back.  Patient would like an inhaler called in    [address] [facility]

## 2022-03-24 RX ORDER — ALBUTEROL SULFATE 90 UG/1
1-2 AEROSOL, METERED RESPIRATORY (INHALATION)
Qty: 18 G | Refills: 1 | Status: SHIPPED | OUTPATIENT
Start: 2022-03-24 | End: 2022-11-03

## 2022-06-20 ENCOUNTER — TELEPHONE (OUTPATIENT)
Dept: INTERNAL MEDICINE CLINIC | Age: 32
End: 2022-06-20

## 2022-06-23 ENCOUNTER — OFFICE VISIT (OUTPATIENT)
Dept: INTERNAL MEDICINE CLINIC | Age: 32
End: 2022-06-23
Payer: MEDICAID

## 2022-06-23 VITALS
BODY MASS INDEX: 43.4 KG/M2 | HEART RATE: 101 BPM | OXYGEN SATURATION: 99 % | TEMPERATURE: 98.6 F | HEIGHT: 69 IN | DIASTOLIC BLOOD PRESSURE: 78 MMHG | RESPIRATION RATE: 18 BRPM | SYSTOLIC BLOOD PRESSURE: 126 MMHG | WEIGHT: 293 LBS

## 2022-06-23 DIAGNOSIS — M62.838 MUSCLE SPASM: ICD-10-CM

## 2022-06-23 DIAGNOSIS — M77.50 CALCANEAL BURSITIS, UNSPECIFIED LATERALITY: ICD-10-CM

## 2022-06-23 DIAGNOSIS — M46.1 BILATERAL SACROILIITIS (HCC): Primary | ICD-10-CM

## 2022-06-23 PROCEDURE — 99213 OFFICE O/P EST LOW 20 MIN: CPT | Performed by: NURSE PRACTITIONER

## 2022-06-23 RX ORDER — NAPROXEN 500 MG/1
500 TABLET ORAL 2 TIMES DAILY WITH MEALS
Qty: 60 TABLET | Refills: 1 | Status: SHIPPED | OUTPATIENT
Start: 2022-06-23

## 2022-06-23 RX ORDER — TIZANIDINE 4 MG/1
4 TABLET ORAL
Qty: 60 TABLET | Refills: 1 | Status: SHIPPED | OUTPATIENT
Start: 2022-06-23

## 2022-06-23 RX ORDER — CHOLECALCIFEROL (VITAMIN D3) 50 MCG
CAPSULE ORAL
COMMUNITY

## 2022-06-23 NOTE — PROGRESS NOTES
Eron Morfin is a 28 y.o. female     Chief Complaint   Patient presents with    Mass     lump on left ribs    Foot Pain     left foot pain       Visit Vitals  /78 (BP 1 Location: Left arm, BP Patient Position: Sitting, BP Cuff Size: Adult)   Pulse (!) 101   Temp 98.6 °F (37 °C) (Oral)   Resp 18   Ht 5' 9\" (1.753 m)   Wt 299 lb 9.6 oz (135.9 kg)   SpO2 99%   BMI 44.24 kg/m²       Health Maintenance Due   Topic Date Due    Hepatitis C Screening  Never done    Depression Monitoring  Never done    Cervical cancer screen  Never done    COVID-19 Vaccine (3 - Booster for Shopperception series) 10/15/2021         1. \"Have you been to the ER, urgent care clinic since your last visit? Hospitalized since your last visit? \" No    2. \"Have you seen or consulted any other health care providers outside of the [address] [place] [address] since your last visit? \" No     3. For patients aged 39-70: Has the patient had a colonoscopy / FIT/ Cologuard? NA - based on age      If the patient is female:    4. For patients aged 41-77: Has the patient had a mammogram within the past 2 years? NA - based on age or sex      11. For patients aged 21-65: Has the patient had a pap smear? Yes - Care Gap present.  Rooming MA/LPN to request most recent results

## 2022-06-23 NOTE — PROGRESS NOTES
Chief Complaint   Patient presents with    Mass     lump on left ribs    Foot Pain     left foot pain       SUBJECTIVE:    Pau Hawk is a 28 y.o. female who is here today with complaints of sudden onset low back pain after moving her kids' beds at home to clean. She states the pain is often positional.  She has been using some ibuprofen as well as a heating pad to the area, but has not been stretching methodically. She denies any bowel or bladder incontinence. She denies any black tarry stools or blood in her stools. She denies any rash, bruising, or swelling to the area. Additionally, she complains of some left heel pain which has been bothering her off and on for several weeks now. She states it is often worse first thing in the morning, but gradually improves throughout the day. She denies any trauma, rash, redness, bruising, or swelling to the area. She denies any instability of her foot or falls. Current Outpatient Medications   Medication Sig Dispense Refill    B.animalis,bifid,infantis,long (Probiotic 4X) 10-15 mg TbEC Take  by mouth.  tiZANidine (ZANAFLEX) 4 mg tablet Take 1 Tablet by mouth three (3) times daily as needed for Muscle Spasm(s). Indications: muscle spasm 60 Tablet 1    naproxen (NAPROSYN) 500 mg tablet Take 1 Tablet by mouth two (2) times daily (with meals). 60 Tablet 1    buPROPion XL (WELLBUTRIN XL) 300 mg XL tablet TAKE 1 TABLET BY MOUTH EVERY DAY      albuterol (PROVENTIL HFA, VENTOLIN HFA, PROAIR HFA) 90 mcg/actuation inhaler Take 1-2 Puffs by inhalation every four (4) hours as needed for Wheezing. (Patient not taking: Reported on 6/23/2022) 18 g 1    acetaminophen (TYLENOL) 325 mg tablet Take 325 mg by mouth daily as needed. (Patient not taking: Reported on 6/23/2022)      loratadine (CLARITIN) 10 mg tablet Take 1 Tab by mouth daily as needed.  (Patient not taking: Reported on 6/23/2022)      PNV No12-Iron-FA-DSS-OM-3 29 mg iron-1 mg -50 mg CPKD Take  by mouth. (Patient not taking: Reported on 6/23/2022)       Past Medical History:   Diagnosis Date    Anemia     Anemia     Asthma     as a child--no inhaler    Essential hypertension     elevated blood pressures in office    Psychiatric problem     anxiety     Past Surgical History:   Procedure Laterality Date    HX OTHER SURGICAL      wisdom teeth     No Known Allergies    REVIEW OF SYSTEMS:                                        POSITIVE= bold text  Negative = regular text    General:                     fever, chills, sweats, generalized weakness, weight loss/gain,                                       loss of appetite   Eyes:                           blurred vision, eye pain, loss of vision, double vision  ENT:                            rhinorrhea, pharyngitis   Respiratory:               cough, sputum production, SOB, ARTEAGA, wheezing, pleuritic pain   Cardiology:                chest pain, palpitations, orthopnea, PND, edema, syncope   Gastrointestinal:       abdominal pain , N/V, diarrhea, dysphagia, constipation, bleeding   Genitourinary:           frequency, urgency, dysuria, hematuria, incontinence   Muskuloskeletal :      arthralgia, myalgia, back pain  Hematology:              easy bruising, nose or gum bleeding, lymphadenopathy   Dermatological:         rash, ulceration, pruritis, color change / jaundice  Endocrine:                 hot flashes or polydipsia   Neurological:             headache, dizziness, confusion, focal weakness, paresthesia,                                      Speech difficulties, memory loss, gait difficulty  Psychological:          Feelings of anxiety, depression, agitation        Social History     Socioeconomic History    Marital status:    Tobacco Use    Smoking status: Former Smoker     Years: 2.00    Smokeless tobacco: Never Used   Substance and Sexual Activity    Alcohol use: No    Drug use: No    Sexual activity: Never     Social Determinants of Health Financial Resource Strain: Low Risk     Difficulty of Paying Living Expenses: Not hard at all   Food Insecurity: No Food Insecurity    Worried About Running Out of Food in the Last Year: Never true    Ramin of Food in the Last Year: Never true     History reviewed. No pertinent family history. OBJECTIVE:     Visit Vitals  /78 (BP 1 Location: Left arm, BP Patient Position: Sitting, BP Cuff Size: Adult)   Pulse (!) 101   Temp 98.6 °F (37 °C) (Oral)   Resp 18   Ht 5' 9\" (1.753 m)   Wt 299 lb 9.6 oz (135.9 kg)   SpO2 99%   BMI 44.24 kg/m²       Constitutional: She appears well nourished, of stated age, and dressed appropriately. Eyes: Sclera anicteric, PERRLA, EOMI  Neck: Supple without lymphadenopathy. Thyroid normal, No JVD or bruits  Respiratory: Clear to ascultation X5, normal inspiratory effort, no adventitious breath sounds. Cardiovascular: Regular rate and rhythm, no murmurs, rubs or gallops, PMI not displaced, No thrills, no peripheral edema  Musculoskeletal: Moderate tenderness over bilateral SI joints. Upper body range of motion intact. Mild increased tenderness with bilateral leg external rotation. Heel: Mild tenderness to outer aspects of right heel on palpation. Integumentary: No unusual rashes or suspicious skin lesions noted. Neuro: Non-focal exam, A & O X 3, DTRs equal and adequate. ASSESSMENT/PLAN:     ICD-10-CM ICD-9-CM    1. Bilateral sacroiliitis (HCC)  M46.1 720.2 tiZANidine (ZANAFLEX) 4 mg tablet      naproxen (NAPROSYN) 500 mg tablet   2. Muscle spasm  M62.838 728.85 tiZANidine (ZANAFLEX) 4 mg tablet   3. Calcaneal bursitis, unspecified laterality  M77.50 726.79 naproxen (NAPROSYN) 500 mg tablet     1: Patient will be given Zanaflex 4 mg to take up to every 8 hours as needed for muscle spasms. 2: Patient will be given Naprosyn to take twice daily with meals. 3: Handout given for low back pain stretching/SI exercises.   4: Suggested use of gel heel inserts to supportive shoes. 5: May continue to use heating pad or ice as desired for comfort. Orders Placed This Encounter    B.animalis,bifid,infantis,long (Probiotic 4X) 10-15 mg TbEC    tiZANidine (ZANAFLEX) 4 mg tablet    naproxen (NAPROSYN) 500 mg tablet         ATTENTION:   This medical record was transcribed using an electronic medical records system. Although proofread, it may and can contain electronic and spelling errors. Other human spelling and other errors may be present. Corrections may be executed at a later time. Please feel free to contact us for any clarifications as needed. Follow-up and Dispositions    · Return if symptoms worsen or fail to improve. Signed,  Gabino Leonard.  Christin Schwab, MSN APRN FNP-BC

## 2022-06-23 NOTE — PATIENT INSTRUCTIONS
Sacroiliac Joint Pain: Care Instructions  Your Care Instructions     The sacroiliac joints connect the spine and each side of the pelvis. These joints bear the weight and stress of your torso. This makes them easy to injure. Injury or overuse of these joints may cause low back pain. Stress on these joints can cause joint pain. Sacroiliac joint pain is more common in pregnant women. Certain kinds of arthritis also may cause this type of joint pain. Home treatment may help you feel better. So can avoiding activities that stress your back. Your doctor also may recommend physical therapy. This may include doing exercises and stretches to help with pain. You may also learn to use good posture. Follow-up care is a key part of your treatment and safety. Be sure to make and go to all appointments, and call your doctor if you are having problems. It's also a good idea to know your test results and keep a list of the medicines you take. How can you care for yourself at home? · Ask your doctor about light exercises that may help your back pain. Try to do light activity throughout the day. But make sure to take rests as needed. Find a comfortable position for rest, but don't stay in one position for too long. Avoid activities that cause pain. · To apply heat, put a warm water bottle, a heating pad set on low, or a warm cloth on your back. Do not go to sleep with a heating pad on your skin. · Put ice or a cold pack on your back for 10 to 20 minutes at a time. Put a thin cloth between the ice and your skin. · If the doctor gave you a prescription medicine for pain, take it as prescribed. · If you are not taking a prescription pain medicine, ask your doctor if you can take an over-the-counter pain medicine, such as acetaminophen (Tylenol), ibuprofen (Advil, Motrin), or naproxen (Aleve). Read and follow all instructions on the label. Take pain medicines exactly as directed.   · Do not take two or more pain medicines at the same time unless the doctor told you to. Many pain medicines have acetaminophen, which is Tylenol. Too much acetaminophen (Tylenol) can be harmful. · To prevent future back pain, do exercises to stretch and strengthen your back and stomach. Learn how to use good posture, safe lifting techniques, and proper body mechanics. When should you call for help? Call 911 anytime you think you may need emergency care. For example, call if:    · You are unable to move a leg at all. Call your doctor now or seek immediate medical care if:    · You have new or worse symptoms in your legs or buttocks. Symptoms may include:  ? Numbness or tingling. ? Weakness. ? Pain.     · You lose bladder or bowel control. Watch closely for changes in your health, and be sure to contact your doctor if:    · You are not getting better as expected. Where can you learn more? Go to http://www.gray.com/  Enter Y571 in the search box to learn more about \"Sacroiliac Joint Pain: Care Instructions. \"  Current as of: July 1, 2021               Content Version: 13.2  © 1963-9550 [employer]. Care instructions adapted under license by [employer] (which disclaims liability or warranty for this information). If you have questions about a medical condition or this instruction, always ask your healthcare professional. [address] any warranty or liability for your use of this information. Sacroiliac Pain: Exercises  Introduction  Here are some examples of exercises for you to try. The exercises may be suggested for a condition or for rehabilitation. Start each exercise slowly. Ease off the exercises if you start to have pain. You will be told when to start these exercises and which ones will work best for you. How to do the exercises  Knee-to-chest stretch    1. Do not do the knee-to-chest exercise if it causes or increases back or leg pain.   2. Lie on your back with your knees bent and your feet flat on the floor. You can put a small pillow under your head and neck if it is more comfortable. 3. Grasp your hands under one knee and bring the knee to your chest, keeping the other foot flat on the floor. 4. Keep your lower back pressed to the floor. Hold for at least 15 to 30 seconds. 5. Relax and lower the knee to the starting position. Repeat with the other leg. 6. Repeat 2 to 4 times with each leg. 7. To get more stretch, keep your other leg flat on the floor while pulling your knee to your chest.  Bridging    1. Lie on your back with both knees bent. Your knees should be bent about 90 degrees. 2. Tighten your belly muscles by pulling in your belly button toward your spine. Then push your feet into the floor, squeeze your buttocks, and lift your hips off the floor until your shoulders, hips, and knees are all in a straight line. 3. Hold for about 6 seconds as you continue to breathe normally, and then slowly lower your hips back down to the floor and rest for up to 10 seconds. 4. Repeat 8 to 12 times. Hip extension    1. Get down on your hands and knees on the floor. 2. Keeping your back and neck straight, lift one leg straight out behind you. When you lift your leg, keep your hips level. Don't let your back twist, and don't let your hip drop toward the floor. 3. Hold for 6 seconds. Repeat 8 to 12 times with each leg. 4. If you feel steady and strong when you do this exercise, you can make it more difficult. To do this, when you lift your leg, also lift the opposite arm straight out in front of you. For example, lift the left leg and the right arm at the same time. (This is sometimes called the \"bird dog exercise. \") Hold for 6 seconds, and repeat 8 to 12 times on each side. Clamshell    1. Lie on your side with a pillow under your head. Keep your feet and knees together and your knees bent. 2. Raise your top knee, but keep your feet together.  Do not let your hips roll back. Your legs should open up like a clamshell. 3. Hold for 6 seconds. 4. Slowly lower your knee back down. Rest for 10 seconds. 5. Repeat 8 to 12 times. 6. Switch to your other side and repeat steps 1 through 5. Hamstring wall stretch    1. Lie on your back in a doorway, with one leg through the open door. 2. Slide your affected leg up the wall to straighten your knee. You should feel a gentle stretch down the back of your leg. 3. Hold the stretch for at least 1 minute to begin. Then try to lengthen the time you hold the stretch to as long as 6 minutes. 4. Switch legs, and repeat steps 1 through 3.  5. Repeat 2 to 4 times. 6. If you do not have a place to do this exercise in a doorway, there is another way to do it:  7. Lie on your back, and bend one knee. 8. Loop a towel under the ball and toes of that foot, and hold the ends of the towel in your hands. 9. Straighten your knee, and slowly pull back on the towel. You should feel a gentle stretch down the back of your leg. 10. Switch legs, and repeat steps 1 through 3.  11. Repeat 2 to 4 times. 1. Do not arch your back. 2. Do not bend either knee. 3. Keep one heel touching the floor and the other heel touching the wall. Do not point your toes. Lower abdominal strengthening    1. Lie on your back with your knees bent and your feet flat on the floor. 2. Tighten your belly muscles by pulling your belly button in toward your spine. 3. Lift one foot off the floor and bring your knee toward your chest, so that your knee is straight above your hip and your leg is bent like the letter \"L. \"  4. Lift the other knee up to the same position. 5. Lower one leg at a time to the starting position. 6. Keep alternating legs until you have lifted each leg 8 to 12 times. 7. Be sure to keep your belly muscles tight and your back still as you are moving your legs. Be sure to breathe normally. Piriformis stretch    1.  Lie on your back with your legs straight. 2. Lift your affected leg, and bend your knee. With your opposite hand, reach across your body, and then gently pull your knee toward your opposite shoulder. 3. Hold the stretch for 15 to 30 seconds. 4. Switch legs and repeat steps 1 through 3.  5. Repeat 2 to 4 times. Follow-up care is a key part of your treatment and safety. Be sure to make and go to all appointments, and call your doctor if you are having problems. It's also a good idea to know your test results and keep a list of the medicines you take. Where can you learn more? Go to http://www.gray.com/  Enter M000 in the search box to learn more about \"Sacroiliac Pain: Exercises. \"  Current as of: July 1, 2021               Content Version: 13.2  © 9099-9030 [employer]. Care instructions adapted under license by [employer] (which disclaims liability or warranty for this information). If you have questions about a medical condition or this instruction, always ask your healthcare professional. [address] any warranty or liability for your use of this information.

## 2022-09-30 ENCOUNTER — TELEPHONE (OUTPATIENT)
Dept: INTERNAL MEDICINE CLINIC | Age: 32
End: 2022-09-30

## 2022-09-30 NOTE — TELEPHONE ENCOUNTER
----- Message from Martina Da Silva sent at 9/30/2022  5:54 AM EDT -----  Regarding: Congestion in throat  Good morning,  I caught a cold from one of my kids and I have terrible congestion in the bottom of my throat/ feels like its in my chest and have almost completely lost my voice. More than half of the time I can only get whispers out. I have been using saline spray/ gargling with salt water, taking hot showers, drinking tea with honey, and taking ibuprofen ( the constant trying to clear my throat has made it sore and tight feeling) is there anything else you recommend me do?  I'm having trouble sleeping because I'm uncomfortable

## 2022-11-02 ENCOUNTER — TELEPHONE (OUTPATIENT)
Dept: INTERNAL MEDICINE CLINIC | Age: 32
End: 2022-11-02

## 2022-11-02 NOTE — TELEPHONE ENCOUNTER
Pt fell on Halloween and hit her leg hard. She states she has a knot/bruise that is feeling warm to the touch. She states she's never had a bruise feel warm like this. She wants to know if she needs to get this looked at.

## 2022-11-03 ENCOUNTER — OFFICE VISIT (OUTPATIENT)
Dept: INTERNAL MEDICINE CLINIC | Age: 32
End: 2022-11-03
Payer: MEDICAID

## 2022-11-03 VITALS
HEIGHT: 69 IN | WEIGHT: 293 LBS | OXYGEN SATURATION: 99 % | BODY MASS INDEX: 43.4 KG/M2 | TEMPERATURE: 98.3 F | SYSTOLIC BLOOD PRESSURE: 138 MMHG | DIASTOLIC BLOOD PRESSURE: 98 MMHG | RESPIRATION RATE: 18 BRPM | HEART RATE: 89 BPM
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
## 2022-11-03 DIAGNOSIS — S80.12XA HEMATOMA OF LEFT LOWER EXTREMITY, INITIAL ENCOUNTER: Primary | ICD-10-CM

## 2022-11-03 DIAGNOSIS — Z23 ENCOUNTER FOR IMMUNIZATION: ICD-10-CM

## 2022-11-03 PROCEDURE — 99213 OFFICE O/P EST LOW 20 MIN: CPT | Performed by: NURSE PRACTITIONER

## 2022-11-03 PROCEDURE — 90686 IIV4 VACC NO PRSV 0.5 ML IM: CPT | Performed by: NURSE PRACTITIONER

## 2022-11-03 NOTE — PROGRESS NOTES
Chief Complaint   Patient presents with    Bleeding/Bruising     On leg possible cellulitis       SUBJECTIVE:    Birdena Holter is a 28 y.o. female who is here today with complaints of tender bruise to left lower extremity after having fallen on 10/31/2022. Initially she states the area created a lump and a bruise, then began to feel \"more warm. \"  She was concerned for possible infection and would like this evaluated today. However, she notes that her symptoms have lessened as of this morning. She denies any fever or severe pain. She is requesting a flu shot today. Current Outpatient Medications   Medication Sig Dispense Refill    naproxen (NAPROSYN) 500 mg tablet Take 1 Tablet by mouth two (2) times daily (with meals). 60 Tablet 1    B.animalis,bifid,infantis,long (Probiotic 4X) 10-15 mg TbEC Take  by mouth. tiZANidine (ZANAFLEX) 4 mg tablet Take 1 Tablet by mouth three (3) times daily as needed for Muscle Spasm(s).  Indications: muscle spasm (Patient not taking: Reported on 11/3/2022) 60 Tablet 1    buPROPion XL (WELLBUTRIN XL) 300 mg XL tablet TAKE 1 TABLET BY MOUTH EVERY DAY       Past Medical History:   Diagnosis Date    Anemia     Anemia     Asthma     as a child--no inhaler    Essential hypertension     elevated blood pressures in office    Psychiatric problem     anxiety     Past Surgical History:   Procedure Laterality Date    HX OTHER SURGICAL      wisdom teeth     No Known Allergies    REVIEW OF SYSTEMS:                                        POSITIVE= bold text  Negative = regular text    General:                     fever, chills, sweats, generalized weakness, weight loss/gain,                                       loss of appetite   Eyes:                           blurred vision, eye pain, loss of vision, double vision  ENT:                            rhinorrhea, pharyngitis   Respiratory:               cough, sputum production, SOB, ARTEAGA, wheezing, pleuritic pain   Cardiology: chest pain, palpitations, orthopnea, PND, edema, syncope   Gastrointestinal:       abdominal pain , N/V, diarrhea, dysphagia, constipation, bleeding   Genitourinary:           frequency, urgency, dysuria, hematuria, incontinence   Muskuloskeletal :      arthralgia, myalgia, back pain  Hematology:              easy bruising, nose or gum bleeding, lymphadenopathy   Dermatological:         rash, ulceration, pruritis, color change / jaundice  Endocrine:                 hot flashes or polydipsia   Neurological:             headache, dizziness, confusion, focal weakness, paresthesia,                                      Speech difficulties, memory loss, gait difficulty  Psychological:          Feelings of anxiety, depression, agitation        Social History     Socioeconomic History    Marital status:    Tobacco Use    Smoking status: Former     Years: 2.00     Types: Cigarettes    Smokeless tobacco: Never   Substance and Sexual Activity    Alcohol use: No    Drug use: No    Sexual activity: Never     Social Determinants of Health     Financial Resource Strain: Low Risk     Difficulty of Paying Living Expenses: Not hard at all   Food Insecurity: No Food Insecurity    Worried About Running Out of Food in the Last Year: Never true    Ran Out of Food in the Last Year: Never true     History reviewed. No pertinent family history. OBJECTIVE:     Visit Vitals  BP (!) 138/98 (BP 1 Location: Left upper arm, BP Patient Position: Sitting, BP Cuff Size: Adult long)   Pulse 89   Temp 98.3 °F (36.8 °C) (Oral)   Resp 18   Ht 5' 9\" (1.753 m)   Wt 295 lb 3.2 oz (133.9 kg)   SpO2 99%   BMI 43.59 kg/m²       Constitutional: She appears well nourished, of stated age, and dressed appropriately. Eyes: Sclera anicteric, PERRLA, EOMI  Neck: Supple without lymphadenopathy. Thyroid normal, No JVD or bruits  Respiratory: Clear to ascultation X5, normal inspiratory effort, no adventitious breath sounds.   Cardiovascular: Regular rate and rhythm, no murmurs, rubs or gallops, PMI not displaced, No thrills, no peripheral edema  Integumentary: Large slightly raised hematoma to left lateral lower extremity approximately 8.0 x 5.0 cm in size. No redness detected. Minimal warmth. She is  ASSESSMENT/PLAN:     ICD-10-CM ICD-9-CM    1. Hematoma of left lower extremity, initial encounter  S80.12XA 924.5       2. Fall, initial encounter  Chito Raymond. XXXA E888.9       3. Encounter for immunization  Z23 V03.89 INFLUENZA, FLUARIX, FLULAVAL, FLUZONE (AGE 6 MO+), AFLURIA(AGE 3Y+) IM, PF, 0.5 ML        1: Patient instructed to alternate moist heat and cold packs to hematoma area. May use Naprosyn as desired. Inform me if having worsening redness or swelling. Patient acknowledges. 2: Flu shot administered today. 3: Patient to follow-up with me as needed. Patient states understanding and agrees with plan. Orders Placed This Encounter    Influenza, FLUARIX, FLULAVAL, FLUZONE (age 10 mo+), AFLURIA (age 3y+) IM, PF, 0.5 mL         ATTENTION:   This medical record was transcribed using an electronic medical records system. Although proofread, it may and can contain electronic and spelling errors. Other human spelling and other errors may be present. Corrections may be executed at a later time. Please feel free to contact us for any clarifications as needed. Follow-up and Dispositions    Return if symptoms worsen or fail to improve. Signed,  Rosy Gary.  Brian Madsen, MSN APRN FNP-BC

## 2022-11-03 NOTE — PROGRESS NOTES
HIPAA verified by two patient identifiers. John Beach is a 28 y.o. female    Chief Complaint   Patient presents with    Bleeding/Bruising     On leg possible cellulitis       Visit Vitals  BP (!) 138/98 (BP 1 Location: Left upper arm, BP Patient Position: Sitting, BP Cuff Size: Adult long)   Pulse 89   Temp 98.3 °F (36.8 °C) (Oral)   Resp 18   Ht 5' 9\" (1.753 m)   Wt 295 lb 3.2 oz (133.9 kg)   SpO2 99%   BMI 43.59 kg/m²       Pain Scale: 0 - No pain/10  Pain Location:       Health Maintenance Due   Topic Date Due    Cervical cancer screen  Never done    COVID-19 Vaccine (3 - Booster for Pfizer series) 07/10/2021    Flu Vaccine (1) 08/01/2022         Coordination of Care Questionnaire:  :   1) Have you been to an emergency room, urgent care, or hospitalized since your last visit? If yes, where when, and reason for visit? no       2. Have seen or consulted any other health care provider since your last visit? If yes, where when, and reason for visit? NO      Patient is accompanied by self I have received verbal consent from John Beach to discuss any/all medical information while they are present in the room.

## 2022-11-03 NOTE — PROGRESS NOTES
After obtaining patient  consent, and per order from  Kaylyn Metcalf NP, patient received influenza vaccine given by [facility] in left Deltoid. Influenza Vaccine 0.5 mL IM now. Patient was observed for 10 minutes post injection. Patient tolerated injection well.

## (undated) DEVICE — CANISTER WND VAC ASST CLSR --

## (undated) DEVICE — CATH SUC CTRL PRT 10FR LF STRL --

## (undated) DEVICE — ANESTHESIA TRAY SPNL W/O NDL PENCAN

## (undated) DEVICE — STERILE POLYISOPRENE POWDER-FREE SURGICAL GLOVES WITH EMOLLIENT COATING: Brand: PROTEXIS

## (undated) DEVICE — COVERALL PREM SMS 2XL KNIT --

## (undated) DEVICE — KIWI® VACUUM DELIVERY SYSTEM, OMNICUP®: Brand: KIWI® OMNICUP®

## (undated) DEVICE — ROYALSILK SURGICAL GOWN, L: Brand: CONVERTORS

## (undated) DEVICE — TIP CLEANER: Brand: VALLEYLAB

## (undated) DEVICE — DRAPE FLD WRM W44XL66IN C6L FOR INTRATEMP SYS THERMABASIN

## (undated) DEVICE — STAPLER SKIN LEG L3.9MM DIA0.53MM WIDE ROT HD FOR WND CLSR

## (undated) DEVICE — S/USE RESUS KIT W/O MASK (10): Brand: FISHER & PAYKEL HEALTHCARE

## (undated) DEVICE — SOLUTION IV 1000ML 0.9% SOD CHL

## (undated) DEVICE — ROYAL SILK SURGICAL GOWN, XXL: Brand: CONVERTORS

## (undated) DEVICE — PACK PROCEDURE SURG C SECT KT SMH

## (undated) DEVICE — (D)PREP SKN CHLRAPRP APPL 26ML -- CONVERT TO ITEM 371833

## (undated) DEVICE — SUTURE MCRYL SZ 2-0 L36IN ABSRB UD L36MM CT-1 1/2 CIR Y945H

## (undated) DEVICE — TRAXI PANNICULUS RETRACTOR WITH RETENTUS TECHNOLOGY (BMI 30-50): Brand: TRAXI® PANNICULUS RETRACTOR

## (undated) DEVICE — DEVON™ KNEE AND BODY STRAP 60" X 3" (1.5 M X 7.6 CM): Brand: DEVON

## (undated) DEVICE — SPONGE: LAP 18X18 W  200/CS: Brand: MEDICAL ACTION INDUSTRIES

## (undated) DEVICE — SUTURE PDS II SZ 0 L60IN ABSRB VLT L48MM CTX 1/2 CIR Z990G

## (undated) DEVICE — REM POLYHESIVE ADULT PATIENT RETURN ELECTRODE: Brand: VALLEYLAB

## (undated) DEVICE — MEDI-VAC NON-CONDUCTIVE SUCTION TUBING: Brand: CARDINAL HEALTH

## (undated) DEVICE — SOLIDIFIER MEDC 1200ML -- CONVERT TO 356117

## (undated) DEVICE — STERILE POLYISOPRENE POWDER-FREE SURGICAL GLOVES: Brand: PROTEXIS

## (undated) DEVICE — 1200 GUARD II KIT W/5MM TUBE W/O VAC TUBE: Brand: GUARDIAN

## (undated) DEVICE — SUTURE VCRL SZ 0 L36IN ABSRB VLT L40MM CT 1/2 CIR J358H

## (undated) DEVICE — 3000CC GUARDIAN II: Brand: GUARDIAN

## (undated) DEVICE — CATH FOLEY 16F LUBRI-SIL IC --

## (undated) DEVICE — KIT DSG LRG NEG PRSS WND THER VAC GRANUFOAM

## (undated) DEVICE — SUTURE MCRYL SZ 0 L36IN ABSRB UD L36MM CT-1 1/2 CIR Y946H

## (undated) DEVICE — KENDALL SCD EXPRESS SLEEVES, KNEE LENGTH, MEDIUM: Brand: KENDALL SCD

## (undated) DEVICE — Device: Brand: PORTEX

## (undated) DEVICE — SOLUTION IRRIG 1000ML H2O STRL BLT

## (undated) DEVICE — ELECTROSURGICAL DEVICE HOLSTER;FOR USE WITH MAXIMUM PEAK VOLTAGE OF 4000 V: Brand: FORCE TRIVERSE

## (undated) DEVICE — DRSG AQUACEL SURG 3.5 X 10IN -- CONVERT TO ITEM 370183

## (undated) DEVICE — 34" SINGLE PATIENT USE HOVERMATT BREATHABLE: Brand: SINGLE PATIENT USE HOVERMATT

## (undated) DEVICE — Z INACTIVE USE 2240337 DRAPE SURG PT TRANSFER TRAWAY SHT

## (undated) DEVICE — LIGHT HANDLE: Brand: DEVON